# Patient Record
Sex: MALE | Race: WHITE | NOT HISPANIC OR LATINO | URBAN - METROPOLITAN AREA
[De-identification: names, ages, dates, MRNs, and addresses within clinical notes are randomized per-mention and may not be internally consistent; named-entity substitution may affect disease eponyms.]

---

## 2021-10-18 ENCOUNTER — HOSPITAL ENCOUNTER (EMERGENCY)
Facility: HOSPITAL | Age: 41
Discharge: HOME | End: 2021-10-18
Attending: EMERGENCY MEDICINE | Admitting: EMERGENCY MEDICINE
Payer: COMMERCIAL

## 2021-10-18 VITALS
TEMPERATURE: 97.5 F | RESPIRATION RATE: 18 BRPM | DIASTOLIC BLOOD PRESSURE: 89 MMHG | SYSTOLIC BLOOD PRESSURE: 130 MMHG | WEIGHT: 190 LBS | OXYGEN SATURATION: 95 % | HEART RATE: 110 BPM

## 2021-10-18 DIAGNOSIS — S91.351A DOG BITE OF RIGHT FOOT, INITIAL ENCOUNTER: Primary | ICD-10-CM
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
## 2021-10-18 PROCEDURE — 99284 EMERGENCY DEPT VISIT MOD MDM: CPT | Performed by: PHYSICIAN ASSISTANT

## 2021-10-18 PROCEDURE — 12002 RPR S/N/AX/GEN/TRNK2.6-7.5CM: CPT | Performed by: PHYSICIAN ASSISTANT

## 2021-10-18 PROCEDURE — 99283 EMERGENCY DEPT VISIT LOW MDM: CPT

## 2021-10-18 RX ORDER — AMOXICILLIN AND CLAVULANATE POTASSIUM 875; 125 MG/1; MG/1
1 TABLET, FILM COATED ORAL EVERY 12 HOURS
Qty: 14 TABLET | Refills: 0 | Status: SHIPPED | OUTPATIENT
Start: 2021-10-18 | End: 2021-10-18 | Stop reason: SDUPTHER

## 2021-10-18 RX ORDER — LIDOCAINE HYDROCHLORIDE AND EPINEPHRINE 10; 10 MG/ML; UG/ML
10 INJECTION, SOLUTION INFILTRATION; PERINEURAL ONCE
Status: COMPLETED | OUTPATIENT
Start: 2021-10-18 | End: 2021-10-18

## 2021-10-18 RX ORDER — AMOXICILLIN AND CLAVULANATE POTASSIUM 875; 125 MG/1; MG/1
1 TABLET, FILM COATED ORAL EVERY 12 HOURS
Qty: 14 TABLET | Refills: 0 | Status: SHIPPED | OUTPATIENT
Start: 2021-10-18 | End: 2021-10-25

## 2021-10-18 RX ADMIN — LIDOCAINE HYDROCHLORIDE,EPINEPHRINE BITARTRATE 10 ML: 10; .01 INJECTION, SOLUTION INFILTRATION; PERINEURAL at 10:24

## 2022-03-23 ENCOUNTER — APPOINTMENT (EMERGENCY)
Dept: RADIOLOGY | Facility: HOSPITAL | Age: 42
DRG: 812 | End: 2022-03-23
Payer: COMMERCIAL

## 2022-03-23 ENCOUNTER — HOSPITAL ENCOUNTER (INPATIENT)
Facility: HOSPITAL | Age: 42
LOS: 1 days | Discharge: HOME | DRG: 812 | End: 2022-03-24
Attending: EMERGENCY MEDICINE | Admitting: INTERNAL MEDICINE
Payer: COMMERCIAL

## 2022-03-23 DIAGNOSIS — O09.899: ICD-10-CM

## 2022-03-23 DIAGNOSIS — J69.0 ASPIRATION PNEUMONITIS (HCC): ICD-10-CM

## 2022-03-23 DIAGNOSIS — Z98.890: ICD-10-CM

## 2022-03-23 DIAGNOSIS — J96.90 RESPIRATORY FAILURE (HCC): ICD-10-CM

## 2022-03-23 DIAGNOSIS — T50.901A POLYSUBSTANCE OVERDOSE: Primary | ICD-10-CM

## 2022-03-23 PROBLEM — T50.911A DRUG OVERDOSE, MULTIPLE DRUGS: Status: ACTIVE | Noted: 2022-03-23

## 2022-03-23 PROBLEM — R77.8 ELEVATED TROPONIN: Status: ACTIVE | Noted: 2022-03-23

## 2022-03-23 LAB
2HR DELTA HS TROPONIN: 16 NG/L
4HR DELTA HS TROPONIN: 28 NG/L
ALBUMIN SERPL BCP-MCNC: 3.3 G/DL (ref 3.5–5)
ALP SERPL-CCNC: 87 U/L (ref 46–116)
ALT SERPL W P-5'-P-CCNC: 40 U/L (ref 12–78)
AMPHETAMINES SERPL QL SCN: POSITIVE
ANION GAP SERPL CALCULATED.3IONS-SCNC: 5 MMOL/L (ref 4–13)
APAP SERPL-MCNC: <2 UG/ML (ref 10–20)
AST SERPL W P-5'-P-CCNC: 26 U/L (ref 5–45)
ATRIAL RATE: 105 BPM
BARBITURATES UR QL: NEGATIVE
BASE EX.OXY STD BLDV CALC-SCNC: 90.5 % (ref 60–80)
BASE EXCESS BLDA CALC-SCNC: 1 MMOL/L (ref -2–3)
BASE EXCESS BLDV CALC-SCNC: -0.4 MMOL/L
BASOPHILS # BLD AUTO: 0.07 THOUSANDS/ΜL (ref 0–0.1)
BASOPHILS NFR BLD AUTO: 1 % (ref 0–1)
BENZODIAZ UR QL: NEGATIVE
BILIRUB SERPL-MCNC: 0.42 MG/DL (ref 0.2–1)
BUN SERPL-MCNC: 14 MG/DL (ref 5–25)
CA-I BLD-SCNC: 1.25 MMOL/L (ref 1.12–1.32)
CALCIUM ALBUM COR SERPL-MCNC: 8.9 MG/DL (ref 8.3–10.1)
CALCIUM SERPL-MCNC: 8.3 MG/DL (ref 8.3–10.1)
CARDIAC TROPONIN I PNL SERPL HS: 22 NG/L
CARDIAC TROPONIN I PNL SERPL HS: 32 NG/L
CARDIAC TROPONIN I PNL SERPL HS: 34 NG/L
CARDIAC TROPONIN I PNL SERPL HS: 6 NG/L
CHLORIDE SERPL-SCNC: 108 MMOL/L (ref 100–108)
CO2 SERPL-SCNC: 26 MMOL/L (ref 21–32)
COCAINE UR QL: NEGATIVE
CREAT SERPL-MCNC: 0.85 MG/DL (ref 0.6–1.3)
EOSINOPHIL # BLD AUTO: 0.17 THOUSAND/ΜL (ref 0–0.61)
EOSINOPHIL NFR BLD AUTO: 2 % (ref 0–6)
ERYTHROCYTE [DISTWIDTH] IN BLOOD BY AUTOMATED COUNT: 12.4 % (ref 11.6–15.1)
ETHANOL SERPL-MCNC: <3 MG/DL (ref 0–3)
GFR SERPL CREATININE-BSD FRML MDRD: 108 ML/MIN/1.73SQ M
GLUCOSE SERPL-MCNC: 123 MG/DL (ref 65–140)
GLUCOSE SERPL-MCNC: 167 MG/DL (ref 65–140)
HCO3 BLDA-SCNC: 28.3 MMOL/L (ref 24–30)
HCO3 BLDV-SCNC: 26.6 MMOL/L (ref 24–30)
HCT VFR BLD AUTO: 47 % (ref 36.5–49.3)
HCT VFR BLD CALC: 47 % (ref 36.5–49.3)
HGB BLD-MCNC: 15.8 G/DL (ref 12–17)
HGB BLDA-MCNC: 16 G/DL (ref 12–17)
IMM GRANULOCYTES # BLD AUTO: 0.04 THOUSAND/UL (ref 0–0.2)
IMM GRANULOCYTES NFR BLD AUTO: 0 % (ref 0–2)
INR PPP: 1.02 (ref 0.84–1.19)
LYMPHOCYTES # BLD AUTO: 2.74 THOUSANDS/ΜL (ref 0.6–4.47)
LYMPHOCYTES NFR BLD AUTO: 27 % (ref 14–44)
MCH RBC QN AUTO: 29.9 PG (ref 26.8–34.3)
MCHC RBC AUTO-ENTMCNC: 33.6 G/DL (ref 31.4–37.4)
MCV RBC AUTO: 89 FL (ref 82–98)
METHADONE UR QL: NEGATIVE
MONOCYTES # BLD AUTO: 0.65 THOUSAND/ΜL (ref 0.17–1.22)
MONOCYTES NFR BLD AUTO: 6 % (ref 4–12)
NEUTROPHILS # BLD AUTO: 6.47 THOUSANDS/ΜL (ref 1.85–7.62)
NEUTS SEG NFR BLD AUTO: 64 % (ref 43–75)
NRBC BLD AUTO-RTO: 0 /100 WBCS
O2 CT BLDV-SCNC: 21 ML/DL
OPIATES UR QL SCN: NEGATIVE
OXYCODONE+OXYMORPHONE UR QL SCN: NEGATIVE
P AXIS: 65 DEGREES
PCO2 BLD: 30 MMOL/L (ref 21–32)
PCO2 BLD: 54.6 MM HG (ref 42–50)
PCO2 BLDV: 52 MM HG (ref 42–50)
PCP UR QL: NEGATIVE
PH BLD: 7.32 [PH] (ref 7.3–7.4)
PH BLDV: 7.33 [PH] (ref 7.3–7.4)
PLATELET # BLD AUTO: 302 THOUSANDS/UL (ref 149–390)
PLATELET # BLD AUTO: 327 THOUSANDS/UL (ref 149–390)
PMV BLD AUTO: 8.7 FL (ref 8.9–12.7)
PMV BLD AUTO: 8.9 FL (ref 8.9–12.7)
PO2 BLD: 35 MM HG (ref 35–45)
PO2 BLDV: 66.4 MM HG (ref 35–45)
POTASSIUM BLD-SCNC: 3.8 MMOL/L (ref 3.5–5.3)
POTASSIUM SERPL-SCNC: 3.9 MMOL/L (ref 3.5–5.3)
PR INTERVAL: 176 MS
PROT SERPL-MCNC: 6.5 G/DL (ref 6.4–8.2)
PROTHROMBIN TIME: 13 SECONDS (ref 11.6–14.5)
QRS AXIS: 88 DEGREES
QRSD INTERVAL: 102 MS
QT INTERVAL: 370 MS
QTC INTERVAL: 489 MS
RBC # BLD AUTO: 5.28 MILLION/UL (ref 3.88–5.62)
SALICYLATES SERPL-MCNC: <3 MG/DL (ref 3–20)
SAO2 % BLD FROM PO2: 62 % (ref 60–85)
SODIUM BLD-SCNC: 138 MMOL/L (ref 136–145)
SODIUM SERPL-SCNC: 139 MMOL/L (ref 136–145)
SPECIMEN SOURCE: ABNORMAL
T WAVE AXIS: 74 DEGREES
THC UR QL: NEGATIVE
VENTRICULAR RATE: 105 BPM
WBC # BLD AUTO: 10.14 THOUSAND/UL (ref 4.31–10.16)

## 2022-03-23 PROCEDURE — 82330 ASSAY OF CALCIUM: CPT

## 2022-03-23 PROCEDURE — 84484 ASSAY OF TROPONIN QUANT: CPT | Performed by: STUDENT IN AN ORGANIZED HEALTH CARE EDUCATION/TRAINING PROGRAM

## 2022-03-23 PROCEDURE — 82803 BLOOD GASES ANY COMBINATION: CPT

## 2022-03-23 PROCEDURE — 85610 PROTHROMBIN TIME: CPT | Performed by: STUDENT IN AN ORGANIZED HEALTH CARE EDUCATION/TRAINING PROGRAM

## 2022-03-23 PROCEDURE — 84295 ASSAY OF SERUM SODIUM: CPT

## 2022-03-23 PROCEDURE — 93010 ELECTROCARDIOGRAM REPORT: CPT | Performed by: INTERNAL MEDICINE

## 2022-03-23 PROCEDURE — G1004 CDSM NDSC: HCPCS

## 2022-03-23 PROCEDURE — 99285 EMERGENCY DEPT VISIT HI MDM: CPT

## 2022-03-23 PROCEDURE — 93005 ELECTROCARDIOGRAM TRACING: CPT

## 2022-03-23 PROCEDURE — 82805 BLOOD GASES W/O2 SATURATION: CPT

## 2022-03-23 PROCEDURE — 70450 CT HEAD/BRAIN W/O DYE: CPT

## 2022-03-23 PROCEDURE — 99245 OFF/OP CONSLTJ NEW/EST HI 55: CPT | Performed by: EMERGENCY MEDICINE

## 2022-03-23 PROCEDURE — 84132 ASSAY OF SERUM POTASSIUM: CPT

## 2022-03-23 PROCEDURE — 82077 ASSAY SPEC XCP UR&BREATH IA: CPT

## 2022-03-23 PROCEDURE — 85049 AUTOMATED PLATELET COUNT: CPT | Performed by: STUDENT IN AN ORGANIZED HEALTH CARE EDUCATION/TRAINING PROGRAM

## 2022-03-23 PROCEDURE — 99291 CRITICAL CARE FIRST HOUR: CPT | Performed by: EMERGENCY MEDICINE

## 2022-03-23 PROCEDURE — 85014 HEMATOCRIT: CPT

## 2022-03-23 PROCEDURE — 84484 ASSAY OF TROPONIN QUANT: CPT

## 2022-03-23 PROCEDURE — 80179 DRUG ASSAY SALICYLATE: CPT

## 2022-03-23 PROCEDURE — 80307 DRUG TEST PRSMV CHEM ANLYZR: CPT | Performed by: STUDENT IN AN ORGANIZED HEALTH CARE EDUCATION/TRAINING PROGRAM

## 2022-03-23 PROCEDURE — 71045 X-RAY EXAM CHEST 1 VIEW: CPT

## 2022-03-23 PROCEDURE — 82947 ASSAY GLUCOSE BLOOD QUANT: CPT

## 2022-03-23 PROCEDURE — 80053 COMPREHEN METABOLIC PANEL: CPT

## 2022-03-23 PROCEDURE — 80143 DRUG ASSAY ACETAMINOPHEN: CPT

## 2022-03-23 PROCEDURE — 36415 COLL VENOUS BLD VENIPUNCTURE: CPT

## 2022-03-23 PROCEDURE — 94760 N-INVAS EAR/PLS OXIMETRY 1: CPT

## 2022-03-23 PROCEDURE — 85025 COMPLETE CBC W/AUTO DIFF WBC: CPT

## 2022-03-23 RX ORDER — WARFARIN SODIUM 5 MG/1
5 TABLET ORAL
Status: DISCONTINUED | OUTPATIENT
Start: 2022-03-23 | End: 2022-03-24

## 2022-03-23 RX ORDER — NALOXONE HYDROCHLORIDE 1 MG/ML
1 INJECTION PARENTERAL ONCE
Status: COMPLETED | OUTPATIENT
Start: 2022-03-23 | End: 2022-03-23

## 2022-03-23 RX ORDER — ONDANSETRON 2 MG/ML
4 INJECTION INTRAMUSCULAR; INTRAVENOUS EVERY 6 HOURS PRN
Status: DISCONTINUED | OUTPATIENT
Start: 2022-03-23 | End: 2022-03-24 | Stop reason: HOSPADM

## 2022-03-23 RX ORDER — ACETAMINOPHEN 325 MG/1
650 TABLET ORAL EVERY 6 HOURS PRN
Status: DISCONTINUED | OUTPATIENT
Start: 2022-03-23 | End: 2022-03-24 | Stop reason: HOSPADM

## 2022-03-23 RX ORDER — NICOTINE 21 MG/24HR
1 PATCH, TRANSDERMAL 24 HOURS TRANSDERMAL DAILY
Status: DISCONTINUED | OUTPATIENT
Start: 2022-03-24 | End: 2022-03-24 | Stop reason: HOSPADM

## 2022-03-23 RX ADMIN — WARFARIN SODIUM 5 MG: 5 TABLET ORAL at 19:02

## 2022-03-23 RX ADMIN — DEXTROSE 1000 MG: 50 INJECTION, SOLUTION INTRAVENOUS at 17:02

## 2022-03-23 NOTE — ASSESSMENT & PLAN NOTE
-Drug of choice is methamphetamine  Snorted it this morning   -Responded well to narcan and so suspect it was possibly laced with fentanyl   -Toxicology is following   -received one dose intranasal narcan at home and one dose of IV narcan by EMS  -Chest Xray taken in ED shows bilateral consolidations with differential of aspiration vs non-cardiogenic pulmonary edema   Most likely pulmonary edema due to bilaterality   -Pt currently unwilling to start rehab    Plan:  Pt on room air  Discharge with narcan   Will plan for patient to follow-up at [facility] in [place]

## 2022-03-23 NOTE — ED NOTES
Per Dr Reynaldo Oconnell, VBG does not need to be sent due to running CG8+  Lab will discontinue order        Mario Alvarado RN  03/23/22 8494

## 2022-03-23 NOTE — ED PROVIDER NOTES
History  Chief Complaint   Patient presents with    Loss of Consciousness     Per EMS found for unresponsive  Given 0 4 Narcan intranasally and woke up  Upon ED arrival pt  obtunded  SpO2 73 on room air     71-year-old male is presenting via EMS after being found nonresponsive in a private residence  EMS reports that patient was first found unresponsive with blood in his nose and no apparent spontaneous respiratory effort  Members of the household administered 1 dose of intranasal Narcan with return of spontaneous ventilatory effort  Patient remained unresponsive when EMS arrived  EMS was able to obtain IV access, and noted that the patient had a room air SpO2 of 73%  They placed the patient on non-rebreather, and administered IV Narcan with return of responsiveness  Patient at that time became extremely combative, attempting to punch at EMS crew  Patient arrived in the emergency department restrained, somnolent but arousable  Satting 90% on non-rebreather at 15 L  EMS reported that there was suspicion of methamphetamine and narcotic use  Patient is a known user of nasal methamphetamine and has a past history of Percocet abuse  He has a sternotomy from which wife believes he had a 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  Patient is supposed to be on Coumadin, however has not been taking it  None       No past medical history on file  No past surgical history on file  No family history on file  I have reviewed and agree with the history as documented  E-Cigarette/Vaping    E-Cigarette Use Never User      E-Cigarette/Vaping Substances    Nicotine No     THC No     CBD No     Flavoring No     Other No     Unknown No      Social History     Tobacco Use    Smoking status: Current Every Day Smoker    Smokeless tobacco: Current User   Vaping Use    Vaping Use: Never used   Substance Use Topics    Alcohol use:  Yes    Drug use: Not Currently        Review of Systems   Unable to perform ROS: Patient unresponsive       Physical Exam  ED Triage Vitals   Temperature Pulse Respirations Blood Pressure SpO2   03/23/22 1301 03/23/22 1301 03/23/22 1301 03/23/22 1301 03/23/22 1301   98 3 °F (36 8 °C) (!) 111 22 102/68 (!) 73 %      Temp Source Heart Rate Source Patient Position - Orthostatic VS BP Location FiO2 (%)   03/23/22 1301 03/23/22 1301 03/23/22 1315 03/23/22 1301 --   Tympanic Monitor Lying Right arm       Pain Score       03/23/22 2300       No Pain             Orthostatic Vital Signs  Vitals:    03/24/22 0751 03/24/22 0800 03/24/22 0900 03/24/22 1120   BP:    119/71   Pulse: (!) 114 86 84 84   Patient Position - Orthostatic VS:           Physical Exam  Vitals and nursing note reviewed  Constitutional:       General: He is in acute distress  Appearance: He is well-developed and normal weight  He is not toxic-appearing or diaphoretic  HENT:      Head: Normocephalic and atraumatic  Right Ear: External ear normal       Left Ear: External ear normal       Nose: Nose normal  No congestion or rhinorrhea  Comments: Nasal trumpet in left nares, blood coming from left nares, some rhinorrhea noted from both nares  Mouth/Throat:      Mouth: Mucous membranes are dry  Pharynx: No oropharyngeal exudate or posterior oropharyngeal erythema  Eyes:      General: No scleral icterus  Extraocular Movements: Extraocular movements intact  Conjunctiva/sclera: Conjunctivae normal       Pupils: Pupils are equal, round, and reactive to light  Cardiovascular:      Rate and Rhythm: Regular rhythm  Tachycardia present  Pulses: Normal pulses  Heart sounds: Murmur (Systolic click) heard  No friction rub  Pulmonary:      Effort: Respiratory distress present  Breath sounds: No stridor  Rhonchi present  No wheezing or rales  Comments: Tachypnea up to 38 breaths per minute on non-rebreather  Abdominal:      Palpations: Abdomen is soft   There is no mass  Hernia: No hernia is present  Musculoskeletal:         General: No swelling  Normal range of motion  Cervical back: Normal range of motion and neck supple  Right lower leg: No edema  Left lower leg: No edema  Skin:     General: Skin is warm and dry  Capillary Refill: Capillary refill takes less than 2 seconds     Psychiatric:         Mood and Affect: Mood normal          Behavior: Behavior normal          ED Medications  Medications   naloxone (FOR EMS ONLY) (NARCAN) 2 MG/2ML injection 2 mg (0 mg Does not apply Given to EMS 3/23/22 1311)   ceftriaxone (ROCEPHIN) 1 g/50 mL in dextrose IVPB (0 mg Intravenous Stopped 3/23/22 1752)   potassium chloride (K-DUR,KLOR-CON) CR tablet 40 mEq (40 mEq Oral Given 3/24/22 0907)       Diagnostic Studies  Results Reviewed     Procedure Component Value Units Date/Time    Comprehensive metabolic panel [[id number]]  (Abnormal) Collected: 03/24/22 0611    Lab Status: Final result Specimen: Blood from Arm, Right Updated: 03/24/22 0751     Sodium 137 mmol/L      Potassium 3 6 mmol/L      Chloride 104 mmol/L      CO2 28 mmol/L      ANION GAP 5 mmol/L      BUN 14 mg/dL      Creatinine 0 89 mg/dL      Glucose 127 mg/dL      Calcium 8 9 mg/dL      Corrected Calcium 9 7 mg/dL      AST 18 U/L      ALT 37 U/L      Alkaline Phosphatase 67 U/L      Total Protein 6 5 g/dL      Albumin 3 0 g/dL      Total Bilirubin 1 08 mg/dL      eGFR 106 ml/min/1 73sq m     Narrative:      [place] guidelines for Chronic Kidney Disease (CKD):     Stage 1 with normal or high GFR (GFR > 90 mL/min/1 73 square meters)    Stage 2 Mild CKD (GFR = 60-89 mL/min/1 73 square meters)    Stage 3A Moderate CKD (GFR = 45-59 mL/min/1 73 square meters)    Stage 3B Moderate CKD (GFR = 30-44 mL/min/1 73 square meters)    Stage 4 Severe CKD (GFR = 15-29 mL/min/1 73 square meters)    Stage 5 End Stage CKD (GFR <15 mL/min/1 73 square meters)  Note: GFR calculation is accurate only with a steady state creatinine    Phosphorus [[id number]]  (Normal) Collected: 03/24/22 0611    Lab Status: Final result Specimen: Blood from Arm, Right Updated: 03/24/22 0653     Phosphorus 2 7 mg/dL     Magnesium [[id number]]  (Normal) Collected: 03/24/22 0611    Lab Status: Final result Specimen: Blood from Arm, Right Updated: 03/24/22 0653     Magnesium 2 2 mg/dL     Protime-INR [[id number]]  (Abnormal) Collected: 03/24/22 0611    Lab Status: Final result Specimen: Blood from Arm, Right Updated: 03/24/22 0649     Protime 14 7 seconds      INR 1 19    CBC and differential [[id number]]  (Abnormal) Collected: 03/24/22 0611    Lab Status: Final result Specimen: Blood from Arm, Right Updated: 03/24/22 0625     WBC 21 28 Thousand/uL      RBC 5 11 Million/uL      Hemoglobin 15 1 g/dL      Hematocrit 45 0 %      MCV 88 fL      MCH 29 5 pg      MCHC 33 6 g/dL      RDW 12 6 %      MPV 9 1 fL      Platelets 227 Thousands/uL      nRBC 0 /100 WBCs      Neutrophils Relative 84 %      Immat GRANS % 1 %      Lymphocytes Relative 10 %      Monocytes Relative 5 %      Eosinophils Relative 0 %      Basophils Relative 0 %      Neutrophils Absolute 17 80 Thousands/µL      Immature Grans Absolute 0 10 Thousand/uL      Lymphocytes Absolute 2 11 Thousands/µL      Monocytes Absolute 1 12 Thousand/µL      Eosinophils Absolute 0 07 Thousand/µL      Basophils Absolute 0 08 Thousands/µL     HS Troponin 0hr (reflex protocol) [[id number]]  (Normal) Collected: 03/23/22 2135    Lab Status: Final result Specimen: Blood from Arm, Left Updated: 03/23/22 2225     hs TnI 0hr 32 ng/L     HS Troponin I 4hr [[id number]]  (Abnormal) Collected: 03/23/22 1806    Lab Status: Final result Specimen: Blood from Arm, Right Updated: 03/23/22 1853     hs TnI 4hr 34 ng/L      Delta 4hr hsTnI 28 ng/L     Rapid drug screen, urine [[id number]]  (Abnormal) Collected: 03/23/22 1806    Lab Status: Final result Specimen: Urine, Other Updated: 03/23/22 3467 Amph/Meth UR Positive     Barbiturate Ur Negative     Benzodiazepine Urine Negative     Cocaine Urine Negative     Methadone Urine Negative     Opiate Urine Negative     PCP Ur Negative     THC Urine Negative     Oxycodone Urine Negative    Narrative:      FOR MEDICAL PURPOSES ONLY  IF CONFIRMATION NEEDED PLEASE CONTACT THE LAB WITHIN 5 DAYS      Drug Screen Cutoff Levels:  AMPHETAMINE/METHAMPHETAMINES  1000 ng/mL  BARBITURATES     200 ng/mL  BENZODIAZEPINES     200 ng/mL  COCAINE      300 ng/mL  METHADONE      300 ng/mL  OPIATES      300 ng/mL  PHENCYCLIDINE     25 ng/mL  THC       50 ng/mL  OXYCODONE      100 ng/mL    Protime-INR [[id number]]  (Normal) Collected: 03/23/22 1806    Lab Status: Final result Specimen: Blood from Arm, Right Updated: 03/23/22 1826     Protime 13 0 seconds      INR 1 02    Platelet count [[id number]]  (Abnormal) Collected: 03/23/22 1806    Lab Status: Final result Specimen: Blood from Arm, Right Updated: 03/23/22 1816     Platelets 992 Thousands/uL      MPV 8 7 fL     POCT Blood Gas (CG8+) [[id number]]  (Abnormal) Collected: 03/23/22 1656    Lab Status: Final result Specimen: Venous Updated: 03/23/22 1703     ph, Matti ISTAT 7 323     pCO2, Matti i-STAT 54 6 mm HG      pO2, Matti i-STAT 35 0 mm HG      BE, i-STAT 1 mmol/L      HCO3, Matti i-STAT 28 3 mmol/L      CO2, i-STAT 30 mmol/L      O2 Sat, i-STAT 62 %      SODIUM, I-STAT 138 mmol/l      Potassium, i-STAT 3 8 mmol/L      Calcium, Ionized i-STAT 1 25 mmol/L      Hct, i-STAT 47 %      Hgb, i-STAT 16 0 g/dl      Glucose, i-STAT 123 mg/dl      Specimen Type VENOUS    HS Troponin I 2hr [[id number]]  (Normal) Collected: 03/23/22 1534    Lab Status: Final result Specimen: Blood from Arm, Right Updated: 03/23/22 1610     hs TnI 2hr 22 ng/L      Delta 2hr hsTnI 16 ng/L     HS Troponin 0hr (reflex protocol) [[id number]]  (Normal) Collected: 03/23/22 1325    Lab Status: Final result Specimen: Blood from Arm, Left Updated: 03/23/22 1407     hs TnI 0hr 6 ng/L     Comprehensive metabolic panel [[id number]]  (Abnormal) Collected: 03/23/22 1314    Lab Status: Final result Specimen: Blood from Arm, Left Updated: 03/23/22 1353     Sodium 139 mmol/L      Potassium 3 9 mmol/L      Chloride 108 mmol/L      CO2 26 mmol/L      ANION GAP 5 mmol/L      BUN 14 mg/dL      Creatinine 0 85 mg/dL      Glucose 167 mg/dL      Calcium 8 3 mg/dL      Corrected Calcium 8 9 mg/dL      AST 26 U/L      ALT 40 U/L      Alkaline Phosphatase 87 U/L      Total Protein 6 5 g/dL      Albumin 3 3 g/dL      Total Bilirubin 0 42 mg/dL      eGFR 108 ml/min/1 73sq m     Narrative:      [place] guidelines for Chronic Kidney Disease (CKD):     Stage 1 with normal or high GFR (GFR > 90 mL/min/1 73 square meters)    Stage 2 Mild CKD (GFR = 60-89 mL/min/1 73 square meters)    Stage 3A Moderate CKD (GFR = 45-59 mL/min/1 73 square meters)    Stage 3B Moderate CKD (GFR = 30-44 mL/min/1 73 square meters)    Stage 4 Severe CKD (GFR = 15-29 mL/min/1 73 square meters)    Stage 5 End Stage CKD (GFR <15 mL/min/1 73 square meters)  Note: GFR calculation is accurate only with a steady state creatinine    Salicylate level [[id number]]  (Abnormal) Collected: 03/23/22 1314    Lab Status: Final result Specimen: Blood from Arm, Left Updated: 87/03/33 9879     Salicylate Lvl <3 mg/dL     Acetaminophen level-If concentration is detectable, please discuss with medical  on call   [[id number]]  (Abnormal) Collected: 03/23/22 1314    Lab Status: Final result Specimen: Blood from Arm, Left Updated: 03/23/22 1353     Acetaminophen Level <2 ug/mL     Ethanol [[id number]]  (Normal) Collected: 03/23/22 1314    Lab Status: Final result Specimen: Blood from Arm, Left Updated: 03/23/22 1347     Ethanol Lvl <3 mg/dL     CBC and differential [[id number]] Collected: 03/23/22 1314    Lab Status: Final result Specimen: Blood from Arm, Left Updated: 03/23/22 1331     WBC 10 14 Thousand/uL      RBC 5 28 Million/uL      Hemoglobin 15 8 g/dL      Hematocrit 47 0 %      MCV 89 fL      MCH 29 9 pg      MCHC 33 6 g/dL      RDW 12 4 %      MPV 8 9 fL      Platelets 892 Thousands/uL      nRBC 0 /100 WBCs      Neutrophils Relative 64 %      Immat GRANS % 0 %      Lymphocytes Relative 27 %      Monocytes Relative 6 %      Eosinophils Relative 2 %      Basophils Relative 1 %      Neutrophils Absolute 6 47 Thousands/µL      Immature Grans Absolute 0 04 Thousand/uL      Lymphocytes Absolute 2 74 Thousands/µL      Monocytes Absolute 0 65 Thousand/µL      Eosinophils Absolute 0 17 Thousand/µL      Basophils Absolute 0 07 Thousands/µL     Blood gas, venous [[id number]]  (Abnormal) Collected: 03/23/22 1314    Lab Status: Final result Specimen: Blood from Arm, Left Updated: 03/23/22 1325     pH, Matti 7 326     pCO2, Matti 52 0 mm Hg      pO2, Matti 66 4 mm Hg      HCO3, Matti 26 6 mmol/L      Base Excess, Matti -0 4 mmol/L      O2 Content, Matti 21 0 ml/dL      O2 HGB, VENOUS 90 5 %                  CT head without contrast   Final Result by Jose Henning MD (03/23 1500)      No acute intracranial abnormality  Workstation performed: [id number]         XR chest 1 view portable   Final Result by Evelia Snyder MD (03/23 0203)      1  Diffuse bilateral lung consolidation                    Workstation performed: [id number]               Procedures  ECG 12 Lead Documentation Only    Date/Time: 3/26/2022 3:54 PM  Performed by: Yvan Martínez MD  Authorized by: Yvan Martínez MD     Indications / Diagnosis:  Unresponsive/ suspected polysubstance overdose  ECG reviewed by me, the ED Provider: yes    Patient location:  ED  Previous ECG:     Previous ECG:  Unavailable  Interpretation:     Interpretation: abnormal    Rate:     ECG rate:  105    ECG rate assessment: tachycardic    Rhythm:     Rhythm: sinus tachycardia    Ectopy:     Ectopy: none    Conduction:     Conduction: abnormal      Abnormal conduction: incomplete RBBB    ST segments:     ST segments:  Normal  T waves:     T waves: non-specific and inverted      Inverted:  AVL, V1 and V2  Other findings:     Other findings: LAE            ED Course                             SBIRT 20yo+      Most Recent Value   SBIRT (24 yo +)    In order to provide better care to our patients, we are screening all of our patients for alcohol and drug use  Would it be okay to ask you these screening questions? No Filed at: 03/23/2022 1538                [facility]  Number of Diagnoses or Management Options  Polysubstance overdose  Diagnosis management comments: 80-year-old male brought in by EMS after being found unresponsive at home  Patient was administered Narcan intranasal prior to arrival without resolution of his unresponsiveness  However patient was started on IV back of the ambulance and given IV Narcan x1 dose and he quickly awoke swinging at EMS  On arrival, patient was again somnolent but maintaining his airway  Patient was placed immediately on non-rebreather and end-tidal carbon dioxide detector  Patient was able to maintain airway without any assistance throughout the course of his emergency department stay  Workup included a CT of his head as well as the lab work and cardiac workup looking for other causes of his unresponsiveness  However it is most likely that the patient's condition is due to a poly substance overdose  Patient has known history of methamphetamine abuse, however wife who accompanied patient and was seen at bedside states that patient took unknown substance today which may have been a long-acting fentanyl synthetic derivative  Patient is will be monitored closely on step-down unit  I spoke with admitting team (S OD) agreed to admit patient with continuous monitoring on step-down        Disposition  Final diagnoses:   Polysubstance overdose     Time reflects when diagnosis was documented in both MDM as applicable and the Disposition within this note     Time User Action Codes Description Comment    3/23/2022  4:50 PM Terrence Whitten Add [T50 901A] Polysubstance overdose     3/24/2022  3:12 PM Niki Vines Add [J69 0] Aspiration pneumonitis      3/24/2022  3:12 PM Niki Vines Add [O09 899,  Z98 890] History of cardiac surgery        ED Disposition     ED Disposition Condition Date/Time Comment    Admit Stable Wed Mar 23, 2022  4:58 PM Case was discussed with Dr Margareth Corrales and the patient's admission status was agreed to be Admission Status: inpatient status to the service of Dr Antoinette Kenyon   Follow-up Information     Follow up With Specialties Details Why Contact Info Additional Information    Infolink  Schedule an appointment as soon as possible for a visit  [facility] Schedule an appointment as soon as possible for a visit in 1 week(s)  [place] [id number] [facility] [address] [id number]  [facility] [address], [address], [address] [phone]          Discharge Medication List as of 3/24/2022  3:14 PM      START taking these medications    Details   amoxicillin-clavulanate (AUGMENTIN) 875-125 mg per tablet Take 1 tablet by mouth every 12 (twelve) hours for 5 days, Starting Thu 3/24/2022, Until Tue 3/29/2022, Normal      naloxone (NARCAN) 4 mg/0 1 mL nasal spray Administer 1 spray into a nostril  If no response after 2-3 minutes, give another dose in the other nostril using a new spray , Normal               PDMP Review       Value Time User    PDMP Reviewed  Yes 3/23/2022  5:11 PM Benita Briones MD           ED Provider  Attending physically available and evaluated Tyrell Dejessu I managed the patient along with the ED Attending      Electronically Signed by         Kiesha aPige MD  03/26/22 9837

## 2022-03-23 NOTE — ASSESSMENT & PLAN NOTE
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

## 2022-03-23 NOTE — ED NOTES
SpO2:79%, Rn in room to find pt had removed nonrebreather mask  Nonrebreather replaced, pt's SpO2: 91% on 15L nonrebreather        Tam Villaseñor RN  03/23/22 1533

## 2022-03-23 NOTE — ASSESSMENT & PLAN NOTE
Pt presented after overdose on Methamphetamine possibly laced with fentanyl  Chest xray showed diffuse bilateral consolidations -> concerning for possible aspiration pneumonitis  Given 1g/50 mL ceftriaxone in ED    Currently on room air    Plan:  Discharge on PO augmentin for 5 days for aspiration pneumonitis none

## 2022-03-23 NOTE — H&P
INTERNAL MEDICINE RESIDENCY ADMISSION H&P     Name: China Larson   Age & Sex: 40 y o  male   MRN: [id number]  Unit/Bed#: ED 22   Encounter: [id number]  Primary Care Provider: No primary care provider on file  Code Status: Level 1 - Full Code  Admission Status: INPATIENT   Disposition: Patient requires Level 2 Step Down     Admit to team: {DEACON COLES:[id number]}    ASSESSMENT/PLAN     Active Problems:    Drug overdose, multiple drugs    History of cardiac surgery     Aspiration pneumonitis       Aspiration pneumonitis   Assessment & Plan  Pt overdosed on Methamphetamine laced with fentanyl  Chest xray showed diffuse bilateral consolidations  Given 1g/50 mL ceftriaxone in ED  Currently on 12 L NC    Plan:  Continue to monitor respiratory status   Aspiration precautions and head of bed elevation    History of cardiac surgery   Assessment & Plan  Pt has a thoracotomy scar, however no recorded surgery is chart and is a poor historian  Due to history of IV drug abuse, suspect it is for a heart valve replacement following endocarditis  Pt reports he is not on anticoagulation because he "ran out"  Unsure when he ran out or when he had the surgery  Plan:  Check INR and start on warfarin     Drug overdose, multiple drugs  Assessment & Plan  -Drug of choice is methamphetamine  Snorted it this morning   -Responded well to narcan and so suspect it was laced with fentanyl   -Already seen by toxicology  -Given one dose intranasal narcan at home and one dose of IV narcan by EMS  -Chest Xray taken in ED shows bilateral consolidations with differential of aspiration vs non-cardiogenic pulmonary edema  Most likely pulmonary edema due to bilaterality   -Pt currently unwilling to start rehab, will reassess in the morning      Plan:  Narcan 0 4mg IV PRN for respiratory depression  Currently on 12 L midflow - monitor respiratory status and continue supplemental oxygen as needed for hypoxia  Consider benzodiazepines or haldol for agitation  Reassess willingness for rehab in the morning  Discharge with narcan         VTE Pharmacologic Prophylaxis: Warfarin (Coumadin)  VTE Mechanical Prophylaxis: sequential compression device    CHIEF COMPLAINT     Chief Complaint   Patient presents with    Loss of Consciousness     Per EMS found for unresponsive  Given 0 4 Narcan intranasally and woke up  Upon ED arrival pt  obtunded  SpO2 73 on room air      HISTORY OF PRESENT ILLNESS     23YOM with a PMHx of IV drug use and a thoracotomy scar from suspected heart valve replacement was brought to the ED after an overdose on Methamphetamine laced with fentanyl  He reported snorting the methamphetamine ealier in the morning, became unresponsive with respiratory depression, and his wife called EMS  He was given one dose of intranasal narcan by his wife and one dose of IV narcan by EMS upon arrival, which he began responding to  O2 saturation was 73% upon arrival, and a nonrebreather mask was placed with an improvement to 91% O2  Respiratory was consulted and requested the patient be placed on 12L midflow nasal canula  CBC and CMP were negative, troponins at 0 and 1 hrs were 6 and 22, respectively  Urine drug screen positive for amphetamines  Chest xray revealed diffuse bilateral lung consolidations suspicious for aspiration vs pulmonary edema  CT head was unremarkable  Patient is a poor historian and intermittently responds to questions  Wife cannot be found however per ED does not know anything about patient's history        REVIEW OF SYSTEMS     Review of Systems   Unable to perform ROS: Mental status change     OBJECTIVE     Vitals:    22 1539 22 1600 22 1645 22 1745   BP:  148/90 137/92 152/82   BP Location:  Right arm  Right arm   Pulse:  100 (!) 109 (!) 107   Resp:  (!) 32 (!) 32    Temp:       TempSrc:       SpO2: 92% 92% 93% 92%      Temperature:   Temp (24hrs), Av 3 °F (36 8 °C), Min:98 3 °F (36 8 °C), Max:98 3 °F (36 8 °C)    Temperature: 98 3 °F (36 8 °C)  Intake & Output:  I/O       03/21 0701 03/22 0700 03/22 0701 03/23 0700 03/23 0701 03/24 0700    IV Piggyback   50    Total Intake   50    Net   +50               Weights: There is no height or weight on file to calculate BMI  Weight (last 2 days)     None        Physical Exam  Vitals and nursing note reviewed  Constitutional:       Appearance: He is well-developed  He is ill-appearing and toxic-appearing  Comments: somnolent and intermittently responds to questions   HENT:      Head: Normocephalic and atraumatic  Nose:      Comments: Dried blood present in nostrils   Eyes:      Conjunctiva/sclera: Conjunctivae normal    Cardiovascular:      Rate and Rhythm: Normal rate and regular rhythm  Pulses: Normal pulses  Pulmonary:      Effort: Respiratory distress present  Comments: Bilateral course breath sounds  Abdominal:      Palpations: Abdomen is soft  Tenderness: There is no abdominal tenderness  Musculoskeletal:      Cervical back: Neck supple  Skin:     General: Skin is warm and dry  PAST MEDICAL HISTORY   No past medical history on file  PAST SURGICAL HISTORY   No past surgical history on file  SOCIAL & FAMILY HISTORY     Social History     Substance and Sexual Activity   Alcohol Use Yes     Social History     Substance and Sexual Activity   Drug Use Not Currently     Social History     Tobacco Use   Smoking Status Current Every Day Smoker   Smokeless Tobacco Current User     No family history on file  LABORATORY DATA     Labs: I have personally reviewed pertinent reports      Results from last 7 days   Lab Units 03/23/22  1806 03/23/22  1656 03/23/22  1314   WBC Thousand/uL  --   --  10 14   HEMOGLOBIN g/dL  --   --  15 8   I STAT HEMOGLOBIN g/dl  --  16 0  --    HEMATOCRIT %  --   --  47 0   HEMATOCRIT, ISTAT %  --  47  --    PLATELETS Thousands/uL 302  --  327   NEUTROS PCT %  --   --  64   MONOS PCT %  --   --  6 Results from last 7 days   Lab Units 03/23/22  1656 03/23/22  1314   POTASSIUM mmol/L  --  3 9   CHLORIDE mmol/L  --  108   CO2 mmol/L  --  26   CO2, I-STAT mmol/L 30  --    BUN mg/dL  --  14   CREATININE mg/dL  --  0 85   CALCIUM mg/dL  --  8 3   ALK PHOS U/L  --  87   ALT U/L  --  40   AST U/L  --  26   GLUCOSE, ISTAT mg/dl 123  --                           Micro:  No results found for: BLOODCX, URINECX, WOUNDCULT, SPUTUMCULTUR  IMAGING & DIAGNOSTIC TESTS     Imaging: I have personally reviewed pertinent reports  XR chest 1 view portable    Result Date: 3/23/2022  Impression: 1  Diffuse bilateral lung consolidation  Workstation performed: [id number]     CT head without contrast    Result Date: 3/23/2022  Impression: No acute intracranial abnormality  Workstation performed: [id number]     EKG, Pathology, and Other Studies: I have personally reviewed pertinent reports       ALLERGIES     Allergies   Allergen Reactions    Vancomycin Hives     MEDICATIONS PRIOR TO ARRIVAL     Prior to Admission medications    Not on File     MEDICATIONS ADMINISTERED IN LAST 24 HOURS     Medication Administration - last 24 hours from 03/22/2022 1820 to 03/23/2022 1820       Date/Time Order Dose Route Action Action by     03/23/2022 1311 naloxone (FOR EMS ONLY) [facility]) 2 MG/2ML injection 2 mg 0 mg Does not apply Given to [facility], RN     03/23/2022 1658 piperacillin-tazobactam (ZOSYN) 3 375 g in sodium chloride 0 9 % 100 mL IVPB 3 375 g Intravenous Not Given Tam Villaseñor RN     03/23/2022 1752 ceftriaxone (ROCEPHIN) 1 g/50 mL in dextrose IVPB 0 mg Intravenous Stopped Tory Serna     03/23/2022 1702 ceftriaxone (ROCEPHIN) 1 g/50 mL in dextrose IVPB 1,000 mg Intravenous New Bag Tam Villaseñor RN        CURRENT MEDICATIONS     Current Facility-Administered Medications   Medication Dose Route Frequency Provider Last Rate    acetaminophen  650 mg Oral Q6H PRN Lucy Kaplan MD      naloxone  0 04 mg Intravenous Q1MIN PRN MD Charly Mejíagerson Olesya ON 3/24/2022] nicotine  1 patch Transdermal Daily Neri Zhang MD      ondansetron  4 mg Intravenous Q6H PRN Neri Zhang MD          acetaminophen, 650 mg, Q6H PRN  naloxone, 0 04 mg, Q1MIN PRN  ondansetron, 4 mg, Q6H PRN        Admission Time  I spent 30 minutes admitting the patient  This involved direct patient contact where I performed a full history and physical, reviewing previous records, and reviewing laboratory and other diagnostic studies  Portions of the record may have been created with voice recognition software  Occasional wrong word or "sound a like" substitutions may have occurred due to the inherent limitations of voice recognition software    Read the chart carefully and recognize, using context, where substitutions have occurred     ==  [facility]  Internal Medicine MS3

## 2022-03-23 NOTE — ED NOTES
Per Dr Jesus Braun, respiratory called for patient to be placed on midflow        Jia Clark RN  03/23/22 1293

## 2022-03-23 NOTE — ED NOTES
Pt  On rebreather, spO2 91%  Dr Kayleigh Alfredo at bedside        Vishal Quinteroelor, RN  03/23/22 7941

## 2022-03-23 NOTE — ED NOTES
Dr Naomi Vergara  And security at bedside  Pt  Being restrained        Capri Conte, RN  03/23/22 4272

## 2022-03-23 NOTE — H&P
INTERNAL MEDICINE RESIDENCY ADMISSION H&P     Name: Anh Riggins   Age & Sex: 40 y o  male   MRN: [id number]  Unit/Bed#: ED 22   Encounter: [id number]  Primary Care Provider: No primary care provider on file  Code Status: Level 1 - Full Code  Admission Status: INPATIENT   Disposition: Patient requires Level 2 Step Down     Admit to team: SOD Team C     ASSESSMENT/PLAN     Active Problems:    Drug overdose, multiple drugs    History of cardiac surgery     Aspiration pneumonitis       Elevated troponin  Assessment & Plan  Patient presented with a troponin of 6 which later increased to 22 and 34  Believed to be secondary to methamphetamine use  ECG showed sinus tach  Plan:  Continue to trend troponins    Continue telemetry monitoring    Aspiration pneumonitis   Assessment & Plan  Pt presented after overdose on Methamphetamine possibly laced with fentanyl  Chest xray showed diffuse bilateral consolidations -> concerning for possible aspiration pneumonitis  Given 1g/50 mL ceftriaxone in ED  Currently on 12 L NC    Plan: Will continue to monitor off antibiotics for now -> can restart if patient shows signs of infection  Continue to monitor respiratory status   Aspiration precautions and head of bed elevation    History of cardiac surgery   Assessment & Plan  Pt has a thoracotomy scar, however no recorded surgery is chart and is a poor historian  Due to history of IV drug abuse, suspicion for heart valve replacement is high  Pt reports he is not on anticoagulation because he "ran out"  Unsure when he ran out or when he had the surgery  As per the ED, the patient's wife did not know any details of his medical history  Plan: Will start warfarin 5 mg daily for now   Monitor INR and adjust warfarin as needed    Drug overdose, multiple drugs  Assessment & Plan  -Drug of choice is methamphetamine   Snorted it this morning   -Responded well to narcan and so suspect it was possibly laced with fentanyl   -Toxicology is following   -received one dose intranasal narcan at home and one dose of IV narcan by EMS  -Chest Xray taken in ED shows bilateral consolidations with differential of aspiration vs non-cardiogenic pulmonary edema  Most likely pulmonary edema due to bilaterality   -Pt currently unwilling to start rehab, will reassess in the morning  Plan:  Narcan 0 4mg IV PRN for respiratory depression  Currently on 12 L midflow - monitor respiratory status and wean supplemental oxygen maintaining SpO2 >90%  Consider benzodiazepines or haldol for agitation  Reassess willingness for rehab in the morning  Discharge with narcan       VTE Pharmacologic Prophylaxis: Warfarin  VTE Mechanical Prophylaxis: sequential compression device    CHIEF COMPLAINT     Chief Complaint   Patient presents with    Loss of Consciousness     Per EMS found for unresponsive  Given 0 4 Narcan intranasally and woke up  Upon ED arrival pt  obtunded  SpO2 73 on room air      HISTORY OF PRESENT ILLNESS     The patient is a 43-year-old male with a past medical history of IV drug abuse and open-heart surgery (likely mechanical tricuspid valve repair but unclear at this time) who presented to the emergency department after being found unresponsive at home by his wife  His current drug of choice is methamphetamine which he admitted to snorting earlier today  At home his wife administered 1 dose of intranasal Shelley Sers showed a return of spontaneous ventilatory effort  When the EMS arrived the patient remained unresponsive and a 2nd dose of IV Narcan was administered  Following this, the patient became responsive and was reportedly extremely combative with the EMS  He was originally satting in the mid 76s on room air and he was started on a non-rebreather mask  In the emergency department he was transition to mid flow nasal cannula and eventually 12 L nasal cannula    He remained somnolent in the emergency department on exam but did respond intermittently to questioning  He admitted to snorting methamphetamine earlier in the day, he admitted to undergoing open heart surgery in the past and has been noncompliant with his medications, and he reported that he is not sure if he would like to pursue rehab following his hospitalization  When questioned about code status he refused to answer  The patient's wife was no longer with him during the encounter and her phone number could not be found  As per ED, she did not know much about the patient's past medical history  Toxicology was also present during the encounter  Upon presentation to the emergency department the patient was tachycardic with a pulse of 111, tachypneic respiratory rate in the 20s to 30s, and was satting 73% on room air  He was afebrile and blood pressure was stable  He was placed on 15 L non-rebreather mask which was later weaned down to 12 L nasal cannula and was saturating in the mid 90s  CBC and CMP were unremarkable  HS Troponin was 6 and 22 at 2 hours  Coma panel, including acetaminophen level, were negative  INR was 1 02  Urine toxicology was positive for methamphetamine  ECG showed sinus tach  Venous blood gas showed a pH 7 32, pCO2 of 52, PO2 is 66 4 and HC03 of 26 6  CXR showed diffuse bilateral lung consolidation  CT head showed no acute intracranial abnormality  Given the patient's respiratory depression and improvement with administration of naloxone, the most likely cause of his symptoms was methamphetamine use laced with an opioid (likely fentanyl)  He was admitted under step-down 2 with continuous telemetry monitoring  REVIEW OF SYSTEMS     Review of Systems   · Unable to be obtained due to patient's somnolence, however when asked if anything was bothering him he said "no"  Also when questioned if he is experiencing any chest pain or shortness of breath he responded "no"       OBJECTIVE     Vitals:    03/23/22 1600 03/23/22 1645 03/23/22 1745 22 1845   BP: 148/90 137/92 152/82 135/76   BP Location: Right arm  Right arm Right arm   Pulse: 100 (!) 109 (!) 107 (!) 108   Resp: (!) 32 (!) 32  (!) 23   Temp:       TempSrc:       SpO2: 92% 93% 92% 92%      Temperature:   Temp (24hrs), Av 3 °F (36 8 °C), Min:98 3 °F (36 8 °C), Max:98 3 °F (36 8 °C)    Temperature: 98 3 °F (36 8 °C)  Intake & Output:  I/O        0701   0700  0701   07 0701   0700    IV Piggyback   50    Total Intake   50    Net   +50               Weights: There is no height or weight on file to calculate BMI  Weight (last 2 days)     None        Physical Exam  Constitutional:       Appearance: He is well-developed  He is ill-appearing  Comments: Somnolent   HENT:      Head: Normocephalic and atraumatic  Nose: Nose normal       Comments: Dried blood in nares  Eyes:      General:         Right eye: No discharge  Left eye: No discharge  Conjunctiva/sclera: Conjunctivae normal       Pupils: Pupils are equal, round, and reactive to light  Neck:      Thyroid: No thyromegaly  Cardiovascular:      Rate and Rhythm: Normal rate and regular rhythm  Heart sounds: Normal heart sounds  No murmur heard  No friction rub  No gallop  Pulmonary:      Effort: Pulmonary effort is normal  No respiratory distress  Breath sounds: No stridor  No wheezing or rales  Comments: Coarse breath sounds  On 12 L nasal cannula  Chest:      Chest wall: No tenderness  Abdominal:      General: Bowel sounds are normal  There is no distension  Palpations: Abdomen is soft  There is no mass  Tenderness: There is no abdominal tenderness  There is no guarding or rebound  Musculoskeletal:         General: No tenderness or deformity  Right lower leg: No edema  Left lower leg: No edema  Lymphadenopathy:      Cervical: No cervical adenopathy  Skin:     General: Skin is warm and dry        Comments: Healed vertical surgical scar on sternum   Psychiatric:      Comments: Lethargic, intermittently responding to questions  PAST MEDICAL HISTORY   No past medical history on file  PAST SURGICAL HISTORY   No past surgical history on file  SOCIAL & FAMILY HISTORY     Social History     Substance and Sexual Activity   Alcohol Use Yes     Substance and Sexual Activity   Alcohol Use Yes        Substance and Sexual Activity   Drug Use Not Currently     Social History     Tobacco Use   Smoking Status Current Every Day Smoker   Smokeless Tobacco Current User     No family history on file  LABORATORY DATA     Labs: I have personally reviewed pertinent reports  Results from last 7 days   Lab Units 03/23/22  1806 03/23/22  1656 03/23/22  1314   WBC Thousand/uL  --   --  10 14   HEMOGLOBIN g/dL  --   --  15 8   I STAT HEMOGLOBIN g/dl  --  16 0  --    HEMATOCRIT %  --   --  47 0   HEMATOCRIT, ISTAT %  --  47  --    PLATELETS Thousands/uL 302  --  327   NEUTROS PCT %  --   --  64   MONOS PCT %  --   --  6      Results from last 7 days   Lab Units 03/23/22  1656 03/23/22  1314   POTASSIUM mmol/L  --  3 9   CHLORIDE mmol/L  --  108   CO2 mmol/L  --  26   CO2, I-STAT mmol/L 30  --    BUN mg/dL  --  14   CREATININE mg/dL  --  0 85   CALCIUM mg/dL  --  8 3   ALK PHOS U/L  --  87   ALT U/L  --  40   AST U/L  --  26   GLUCOSE, ISTAT mg/dl 123  --               Results from last 7 days   Lab Units 03/23/22  1806   INR  1 02             Micro:  No results found for: Qasim , WOUNDCULT, SPUTUMCULTUR  IMAGING & DIAGNOSTIC TESTS     Imaging: I have personally reviewed pertinent reports  XR chest 1 view portable    Result Date: 3/23/2022  Impression: 1  Diffuse bilateral lung consolidation  Workstation performed: [id number]     CT head without contrast    Result Date: 3/23/2022  Impression: No acute intracranial abnormality   Workstation performed: [id number]     EKG, Pathology, and Other Studies: I have personally reviewed pertinent reports  ALLERGIES     Allergies   Allergen Reactions    Vancomycin Hives     MEDICATIONS PRIOR TO ARRIVAL     Prior to Admission medications    Not on File     MEDICATIONS ADMINISTERED IN LAST 24 HOURS     Medication Administration - last 24 hours from 03/22/2022 1849 to 03/23/2022 1849       Date/Time Order Dose Route Action Action by     03/23/2022 1311 naloxone (FOR EMS ONLY) [facility]) 2 MG/2ML injection 2 mg 0 mg Does not apply Given to [facility], RN     03/23/2022 1658 piperacillin-tazobactam (ZOSYN) 3 375 g in sodium chloride 0 9 % 100 mL IVPB 3 375 g Intravenous Not Given Claude Ulysses, JAIME     03/23/2022 1752 ceftriaxone (ROCEPHIN) 1 g/50 mL in dextrose IVPB 0 mg Intravenous Stopped Tory CALIXTO Serna     03/23/2022 1702 ceftriaxone (ROCEPHIN) 1 g/50 mL in dextrose IVPB 1,000 mg Intravenous New Bag Claude Semen, RN        CURRENT MEDICATIONS     Current Facility-Administered Medications   Medication Dose Route Frequency Provider Last Rate    acetaminophen  650 mg Oral Q6H PRN Vinny Paul MD      naloxone  0 04 mg Intravenous Q1MIN PRN Vinny Paul MD     [facility] nicotine  1 patch Transdermal Daily Vinny Paul MD      ondansetron  4 mg Intravenous Q6H PRN Vinny Paul MD      warfarin  5 mg Oral Daily (warfarin) Vinny Paul MD          acetaminophen, 650 mg, Q6H PRN  naloxone, 0 04 mg, Q1MIN PRN  ondansetron, 4 mg, Q6H PRN        Admission Time  I spent 1 hour admitting the patient  This involved direct patient contact where I performed a full history and physical, reviewing previous records, and reviewing laboratory and other diagnostic studies  Portions of the record may have been created with voice recognition software  Occasional wrong word or "sound a like" substitutions may have occurred due to the inherent limitations of voice recognition software    Read the chart carefully and recognize, using context, where substitutions have occurred     ==  Ha Hooks MD  [address]  Internal Medicine Residency PGY-2

## 2022-03-23 NOTE — CONSULTS
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 mechanical valve replacement--non-compliant with anticoagulation    RECOMMENDATIONS:  Patient endorses methamphetamine use, and I suspect his product was contaminated with fentanyl or an alternative opioid/analog causing respiratory depression given his initial response to naloxone  Patient denies recent opioid use and would therefore be opioid naive, putting him at risk for recurrent respiratory depression, as the half life of naloxone likely is shorter than the opioid  1  Continue supportive care with airway monitoring, head of bed elevation, aspiration precautions  2  Naloxone 0 4 mg IV as needed for respiratory depression    3  Continue supplemental oxygen as needed for hypoxia; patient's CXR shows bilateral consolidation with differential of aspiration vs non-cardiogenic pulmonary edema  Given bilateral nature and history, favor non-cardiogenic pulmonary edema  Patient may benefit from mid-high flow O2 or BiPAP if tolerated  4  Benzodiazepines as needed for agitation; alternatively, can consider haloperidol if significant psychomotor agitation and/or choreoathetosis  Use with caution if patient is exhibiting respiratory depression  5  Monitor for rhabdomyolysis and renal dysfunction    6  Case management consultation for resources--patient states "I have too much going on right now for rehab "    7  Please discharge the patient with naloxone for harm reduction      For further questions, please contact the medical  on call via Tiger Text  Please see additional teaching note below (if available)    [facility] Toxicology   [address]  Sympathomimetic Toxicity     Sympathomimetic toxicity is diagnosed clinically by tachycardia, hypertension, mydriasis, diaphoresis, agitation, and hyperthermia secondary to exposure to sympathomimetic agent, such as cocaine or amphetamine/methamphetamines  This causes overstimulation of central nervous system resulting in increased catecholamine release  Primary treatment includes benzodiazepines to suppress central nervous system stimulation  If not treated aggressively enough, hyperthermia and agitation may result in disseminated intravascular coagulation and renal injury, respectively  If hyperthermia occurs and is refractory to aggressive treatment with benzodiazepines, intravenous fluids and external cooling, then intubation and paralysis are necessary  If renal injury is present, patient should be assessed for rhabdomyolysis  In addition, cardiac concerns include ischemia and sodium channel blockade  Untreated tachycardia can result in demand ischemia, especially if there is any underlying coronary artery disease  Na channel blockade is demonstrated by QRS widening on ECG and is treated with sodium bicarbonate  Treatment of these issues should be in conjunction with a medical       Medical Toxicology  [address]  Discussion: Opioids/Opiates     Introduction: Opiates are naturally occurring compounds derived from the juice of the poppy Papaver somniferum while opioids are these as well as synthetically derived opiate analogs  They are used predominately as pain medications and are frequently abused  Metabolism/Pharmacokinetics: Opioids stimulate opiate receptors in the CNS causing sedation and respiratory depression    Death typically results from respiratory failure secondary to apnea or aspiration  Peak effect typically occurs in 2-3 hours after ingestion by may be delayed secondary to decreased gastric motility or extended release preparations  Rates of elimination vary widely from 1 to 30 hours depending on the formulation  Toxic Dose:  Varies widely based on the medication and the patients tolerance to the drug  Clinical Presentation:      Mild-Moderate Overdose:  Lethargy, pinpoint pupils, decreased blood pressure, decreased heart rate, decreased bowel sounds, muscle laxity  Severe Overdose:  Coma, respiratory depression, apnea, noncardiogenic pulmonary edema, death  Specific Agents:  Codeine, dextromethorphan, meperiding, propoxyphene, and tramadol may cause seizures  Propoxyphene may produce cardiotoxicity  Withdrawal symptoms:  While the withdrawal symptoms make may the patient wish they were dead, they are not fatal   Symptoms include anxiety, piloerection, abdominal cramps, diarrhea, and insomnia  Diagnosis:  The diagnosis of opioid intoxication/withdrawal is typically clinical   While a urine drug screen can detect morphine it is unable to detect synthetic opioids and the utility of the UDS in adult patients is limited  Treatment:    1) The key intervention necessary to avoid morbidity/mortality in the opioid overdose patient is appropriate airway/respiratory management  2) Naloxone (Narcan) can be used to reverse an overdose but should be used cautiously in opioid dependent patient (0 2 to 0 4 mg IV, repeated to desired effect) given the potential for withdrawal symptoms or unmasking of a co-ingestant such as a sympathomimetic  Naloxone has a half-fife of only 1-2 hours (less than many narcotics) so the patient must be monitored closely for sedation/apnea for 4 hours after the last dose of narcan      Decontamination:  Activated charcoal may be used in acute ingestions but the risk of aspiration limits its use in the non-intubated patient  References    Opiates and Opioids  Jacquelinelucero MoseleyMarc  In [facility], ed  [address]: [place], 2004: pp  [phone]  Hx and PE limited by: patient cooperation    HPI: Danielle Roberts is a 40y o  year old male who presents with encephalopathy and hypoxia  Patient found unresponsive at home; responded to intra-nasal naloxone  Received additional dose of IV naloxone  Patient hypoxic and placed on O2  CXR with bilateral consolidations  Laboratory studies are grossly normal, and coma panel is negative  Patient states he was using methamphetamine  Denies current opioid use  Denies benzodiazepine, alcohol, and other drug use  Will not answer ROS questions, stating he has to urinate  Review of Systems   Unable to perform ROS: Other (Patient uncooperative)       Historical Information   No past medical history on file  No past surgical history on file  Social History   Social History     Substance and Sexual Activity   Alcohol Use Yes     Social History     Substance and Sexual Activity   Drug Use Not Currently     Social History     Tobacco Use   Smoking Status Current Every Day Smoker   Smokeless Tobacco Current User     No family history on file       Prior to Admission medications    Not on File       Current Facility-Administered Medications   Medication Dose Route Frequency    ceftriaxone (ROCEPHIN) 1 g/50 mL in dextrose IVPB  1,000 mg Intravenous Once       Allergies   Allergen Reactions    Vancomycin Hives       Objective     No intake or output data in the 24 hours ending 03/23/22 1723    Invasive Devices:   Peripheral IV 03/23/22 Right Antecubital (Active)       Peripheral IV 03/23/22 Left Forearm (Active)       Vitals   Vitals:    03/23/22 1315 03/23/22 1515 03/23/22 1600 03/23/22 1645   BP: 132/87 153/86 148/90 137/92   TempSrc:       Pulse: 101 99 100 (!) 109   Resp: (!) 31  (!) 32 (!) 32   Patient Position - Orthostatic VS: Lying Lying Lying Lying   Temp: Physical Exam  Vitals and nursing note reviewed  Constitutional:       General: He is in acute distress  Appearance: He is ill-appearing  Comments: Somnolent but arouses to voice  More conversant over time  Agitated   HENT:      Head: Normocephalic  Comments: Blood at nares     Nose: Nose normal       Mouth/Throat:      Mouth: Mucous membranes are moist    Eyes:      General: No scleral icterus  Pupils: Pupils are equal, round, and reactive to light  Cardiovascular:      Rate and Rhythm: Regular rhythm  Tachycardia present  Heart sounds: Murmur (tricuspid) heard  Comments: midsternal sternotomy scar  Pulmonary:      Effort: No respiratory distress  Breath sounds: Rhonchi (diffuse, bilaterally) present  Abdominal:      General: There is no distension  Musculoskeletal:         General: No swelling or deformity  Cervical back: Neck supple  Skin:     General: Skin is warm and dry  Comments: Numerous tattoos   Neurological:      General: No focal deficit present  Comments: Agitated   Psychiatric:      Comments: Agitated, uncooperative with care         EKG, Pathology, and Other Studies: I have personally reviewed pertinent reports  Lab Results: I have personally reviewed pertinent reports        Labs:  Results from last 7 days   Lab Units 03/23/22  1656 03/23/22  1314   WBC Thousand/uL  --  10 14   HEMOGLOBIN g/dL  --  15 8   I STAT HEMOGLOBIN g/dl 16 0  --    HEMATOCRIT %  --  47 0   HEMATOCRIT, ISTAT % 47  --    PLATELETS Thousands/uL  --  327   NEUTROS PCT %  --  64   LYMPHS PCT %  --  27   MONOS PCT %  --  6      Results from last 7 days   Lab Units 03/23/22  1656 03/23/22  1314   POTASSIUM mmol/L  --  3 9   CHLORIDE mmol/L  --  108   CO2 mmol/L  --  26   CO2, I-STAT mmol/L 30  --    BUN mg/dL  --  14   CREATININE mg/dL  --  0 85   CALCIUM mg/dL  --  8 3   ALK PHOS U/L  --  87   ALT U/L  --  40   AST U/L  --  26   GLUCOSE, ISTAT mg/dl 123  -- No results found for: TROPONINI  Results from last 7 days   Lab Units 03/23/22  1314   PH LINDY  7 326   PCO2 LINDY mm Hg 52 0*   PO2 LINDY mm Hg 66 4*   HCO3 LINDY mmol/L 26 6   O2 CONTENT LINDY ml/dL 21 0   O2 HGB, VENOUS % 90 5*     Results from last 7 days   Lab Units 03/23/22  1314   ACETAMINOPHEN LVL ug/mL <2*   ETHANOL LVL mg/dL <3   SALICYLATE LVL mg/dL <3*     Invalid input(s): EXTPREGUR    Imaging Studies: I have personally reviewed pertinent reports  Counseling / Coordination of Care  Total floor / unit time spent today 25 minutes  Greater than 50% of total time was spent with the patient and / or family counseling and / or coordination of care

## 2022-03-24 ENCOUNTER — APPOINTMENT (INPATIENT)
Dept: NON INVASIVE DIAGNOSTICS | Facility: HOSPITAL | Age: 42
DRG: 812 | End: 2022-03-24
Payer: COMMERCIAL

## 2022-03-24 VITALS
RESPIRATION RATE: 18 BRPM | OXYGEN SATURATION: 94 % | SYSTOLIC BLOOD PRESSURE: 119 MMHG | BODY MASS INDEX: 25.71 KG/M2 | TEMPERATURE: 98.5 F | HEART RATE: 84 BPM | WEIGHT: 194 LBS | DIASTOLIC BLOOD PRESSURE: 71 MMHG | HEIGHT: 73 IN

## 2022-03-24 LAB
ALBUMIN SERPL BCP-MCNC: 3 G/DL (ref 3.5–5)
ALP SERPL-CCNC: 67 U/L (ref 46–116)
ALT SERPL W P-5'-P-CCNC: 37 U/L (ref 12–78)
ANION GAP SERPL CALCULATED.3IONS-SCNC: 5 MMOL/L (ref 4–13)
AORTIC ROOT: 3.8 CM
AORTIC VALVE MEAN VELOCITY: 7.7 M/S
APICAL FOUR CHAMBER EJECTION FRACTION: 51 %
ASCENDING AORTA: 3.6 CM (ref 2.1–3.14)
AST SERPL W P-5'-P-CCNC: 18 U/L (ref 5–45)
AV LVOT MEAN GRADIENT: 3 MMHG
AV LVOT PEAK GRADIENT: 5 MMHG
AV MEAN GRADIENT: 3 MMHG
AV PEAK GRADIENT: 4 MMHG
AV VELOCITY RATIO: 1.09
BASOPHILS # BLD AUTO: 0.08 THOUSANDS/ΜL (ref 0–0.1)
BASOPHILS NFR BLD AUTO: 0 % (ref 0–1)
BILIRUB SERPL-MCNC: 1.08 MG/DL (ref 0.2–1)
BUN SERPL-MCNC: 14 MG/DL (ref 5–25)
CALCIUM ALBUM COR SERPL-MCNC: 9.7 MG/DL (ref 8.3–10.1)
CALCIUM SERPL-MCNC: 8.9 MG/DL (ref 8.3–10.1)
CHLORIDE SERPL-SCNC: 104 MMOL/L (ref 100–108)
CO2 SERPL-SCNC: 28 MMOL/L (ref 21–32)
CREAT SERPL-MCNC: 0.89 MG/DL (ref 0.6–1.3)
DOP CALC AO PEAK VEL: 1.05 M/S
DOP CALC AO VTI: 18.1 CM
DOP CALC LVOT PEAK VEL VTI: 19.28 CM
DOP CALC LVOT PEAK VEL: 1.14 M/S
E WAVE DECELERATION TIME: 226 MS
EOSINOPHIL # BLD AUTO: 0.07 THOUSAND/ΜL (ref 0–0.61)
EOSINOPHIL NFR BLD AUTO: 0 % (ref 0–6)
ERYTHROCYTE [DISTWIDTH] IN BLOOD BY AUTOMATED COUNT: 12.6 % (ref 11.6–15.1)
FRACTIONAL SHORTENING: 20 % (ref 28–44)
GFR SERPL CREATININE-BSD FRML MDRD: 106 ML/MIN/1.73SQ M
GLUCOSE SERPL-MCNC: 127 MG/DL (ref 65–140)
HCT VFR BLD AUTO: 45 % (ref 36.5–49.3)
HGB BLD-MCNC: 15.1 G/DL (ref 12–17)
IMM GRANULOCYTES # BLD AUTO: 0.1 THOUSAND/UL (ref 0–0.2)
IMM GRANULOCYTES NFR BLD AUTO: 1 % (ref 0–2)
INR PPP: 1.19 (ref 0.84–1.19)
INTERVENTRICULAR SEPTUM IN DIASTOLE (PARASTERNAL SHORT AXIS VIEW): 1.1 CM
INTERVENTRICULAR SEPTUM: 1.1 CM (ref 0.55–1.03)
LAAS-AP4: 19.7 CM2
LEFT ATRIUM SIZE: 3.7 CM
LEFT INTERNAL DIMENSION IN SYSTOLE: 4.5 CM (ref 3.41–5.16)
LEFT VENTRICULAR INTERNAL DIMENSION IN DIASTOLE: 5.6 CM (ref 5.64–8.4)
LEFT VENTRICULAR POSTERIOR WALL IN END DIASTOLE: 1.1 CM (ref 0.53–1.01)
LEFT VENTRICULAR STROKE VOLUME: 64 ML
LVSV (TEICH): 64 ML
LYMPHOCYTES # BLD AUTO: 2.11 THOUSANDS/ΜL (ref 0.6–4.47)
LYMPHOCYTES NFR BLD AUTO: 10 % (ref 14–44)
MAGNESIUM SERPL-MCNC: 2.2 MG/DL (ref 1.6–2.6)
MCH RBC QN AUTO: 29.5 PG (ref 26.8–34.3)
MCHC RBC AUTO-ENTMCNC: 33.6 G/DL (ref 31.4–37.4)
MCV RBC AUTO: 88 FL (ref 82–98)
MONOCYTES # BLD AUTO: 1.12 THOUSAND/ΜL (ref 0.17–1.22)
MONOCYTES NFR BLD AUTO: 5 % (ref 4–12)
MV E'TISSUE VEL-SEP: 8 CM/S
MV PEAK A VEL: 0.67 M/S
MV PEAK E VEL: 60 CM/S
MV STENOSIS PRESSURE HALF TIME: 66 MS
MV VALVE AREA P 1/2 METHOD: 3.33 CM2
NEUTROPHILS # BLD AUTO: 17.8 THOUSANDS/ΜL (ref 1.85–7.62)
NEUTS SEG NFR BLD AUTO: 84 % (ref 43–75)
NRBC BLD AUTO-RTO: 0 /100 WBCS
PHOSPHATE SERPL-MCNC: 2.7 MG/DL (ref 2.7–4.5)
PLATELET # BLD AUTO: 269 THOUSANDS/UL (ref 149–390)
PMV BLD AUTO: 9.1 FL (ref 8.9–12.7)
POTASSIUM SERPL-SCNC: 3.6 MMOL/L (ref 3.5–5.3)
PROT SERPL-MCNC: 6.5 G/DL (ref 6.4–8.2)
PROTHROMBIN TIME: 14.7 SECONDS (ref 11.6–14.5)
RBC # BLD AUTO: 5.11 MILLION/UL (ref 3.88–5.62)
RIGHT VENTRICLE ID DIMENSION: 4.3 CM
SL CV LV EF: 50
SL CV PED ECHO LEFT VENTRICLE DIASTOLIC VOLUME (MOD BIPLANE) 2D: 155 ML
SL CV PED ECHO LEFT VENTRICLE SYSTOLIC VOLUME (MOD BIPLANE) 2D: 90 ML
SODIUM SERPL-SCNC: 137 MMOL/L (ref 136–145)
TR MAX PG: 32 MMHG
TR PEAK VELOCITY: 2.8 M/S
TRICUSPID VALVE PEAK REGURGITATION VELOCITY: 2.83 M/S
WBC # BLD AUTO: 21.28 THOUSAND/UL (ref 4.31–10.16)
Z-SCORE OF ASCENDING AORTA: 3.73
Z-SCORE OF INTERVENTRICULAR SEPTUM IN END DIASTOLE: 2.57
Z-SCORE OF LEFT VENTRICULAR DIMENSION IN END DIASTOLE: -2.06
Z-SCORE OF LEFT VENTRICULAR DIMENSION IN END SYSTOLE: 0.55
Z-SCORE OF LEFT VENTRICULAR POSTERIOR WALL IN END DIASTOLE: 2.68

## 2022-03-24 PROCEDURE — NC001 PR NO CHARGE: Performed by: INTERNAL MEDICINE

## 2022-03-24 PROCEDURE — 97161 PT EVAL LOW COMPLEX 20 MIN: CPT

## 2022-03-24 PROCEDURE — 97166 OT EVAL MOD COMPLEX 45 MIN: CPT

## 2022-03-24 PROCEDURE — 83735 ASSAY OF MAGNESIUM: CPT | Performed by: STUDENT IN AN ORGANIZED HEALTH CARE EDUCATION/TRAINING PROGRAM

## 2022-03-24 PROCEDURE — 93306 TTE W/DOPPLER COMPLETE: CPT | Performed by: INTERNAL MEDICINE

## 2022-03-24 PROCEDURE — 85025 COMPLETE CBC W/AUTO DIFF WBC: CPT | Performed by: STUDENT IN AN ORGANIZED HEALTH CARE EDUCATION/TRAINING PROGRAM

## 2022-03-24 PROCEDURE — 80053 COMPREHEN METABOLIC PANEL: CPT | Performed by: STUDENT IN AN ORGANIZED HEALTH CARE EDUCATION/TRAINING PROGRAM

## 2022-03-24 PROCEDURE — 99222 1ST HOSP IP/OBS MODERATE 55: CPT | Performed by: INTERNAL MEDICINE

## 2022-03-24 PROCEDURE — 84100 ASSAY OF PHOSPHORUS: CPT | Performed by: STUDENT IN AN ORGANIZED HEALTH CARE EDUCATION/TRAINING PROGRAM

## 2022-03-24 PROCEDURE — 85610 PROTHROMBIN TIME: CPT | Performed by: STUDENT IN AN ORGANIZED HEALTH CARE EDUCATION/TRAINING PROGRAM

## 2022-03-24 PROCEDURE — 93306 TTE W/DOPPLER COMPLETE: CPT

## 2022-03-24 RX ORDER — NALOXONE HYDROCHLORIDE 4 MG/.1ML
SPRAY NASAL
Qty: 1 EACH | Refills: 0 | Status: SHIPPED | OUTPATIENT
Start: 2022-03-24

## 2022-03-24 RX ORDER — POTASSIUM CHLORIDE 20 MEQ/1
40 TABLET, EXTENDED RELEASE ORAL ONCE
Status: COMPLETED | OUTPATIENT
Start: 2022-03-24 | End: 2022-03-24

## 2022-03-24 RX ORDER — AMOXICILLIN AND CLAVULANATE POTASSIUM 875; 125 MG/1; MG/1
1 TABLET, FILM COATED ORAL EVERY 12 HOURS SCHEDULED
Qty: 10 TABLET | Refills: 0 | Status: SHIPPED | OUTPATIENT
Start: 2022-03-24 | End: 2022-03-29

## 2022-03-24 RX ADMIN — POTASSIUM CHLORIDE 40 MEQ: 1500 TABLET, EXTENDED RELEASE ORAL at 09:07

## 2022-03-24 NOTE — PHYSICAL THERAPY NOTE
Physical Therapy Evaluation    Patient's Name: Reese Henderson    Admitting Diagnosis  Overdose [T50 901A]  Polysubstance overdose [T50 901A]    Problem List  Patient Active Problem List   Diagnosis    Drug overdose, multiple drugs    History of cardiac surgery     Aspiration pneumonitis     Elevated troponin       Past Medical History  No past medical history on file  Past Surgical History  No past surgical history on file      03/24/22 1207   PT Last Visit   PT Visit Date 03/24/22   Note Type   Note type Evaluation   Pain Assessment   Pain Assessment Tool 0-10   Pain Score No Pain   Restrictions/Precautions   Weight Bearing Precautions Per Order No   Other Precautions Telemetry   Home Living   Type of [address] [place] [address] One level  (0 KATHY)   Bathroom Shower/Tub Tub/shower unit   Bathroom Toilet Standard   Prior Function   Level of [place] Independent with ADLs and functional mobility   Lives With Mo Help From Family   ADL Assistance Independent   IADLs Independent   Falls in the last 6 months 0   Vocational Full time employment  ()   Comments PTA pt I + active  General   Family/Caregiver Present No   Cognition   Overall Cognitive Status WFL   Attention Within functional limits   Orientation Level Oriented X4   Memory Within functional limits   Following Commands Follows all commands and directions without difficulty   Subjective   Subjective Pt agreeable to mobilize  RLE Assessment   RLE Assessment WNL   LLE Assessment   LLE Assessment WNL   Coordination   Movements are Fluid and Coordinated 1   Bed Mobility   Supine to Sit 6  Modified independent   Additional items HOB elevated   Sit to Supine 6  Modified independent   Additional Comments Pt greeted in supine     Transfers   Sit to Stand 6  Modified independent   Stand to Sit 6  Modified independent   Additional Comments no AD   Ambulation/Elevation   Gait pattern WNL   Gait Assistance 7  Independent   Assistive Device None   Distance 150'   Balance   Static Sitting Normal   Dynamic Sitting Normal   Static Standing Good   Dynamic Standing Fair +   Ambulatory Fair   Endurance Deficit   Endurance Deficit No   Activity Tolerance   Activity Tolerance Patient tolerated treatment well   Medical Staff Made Aware OT Jennifer + RN Jose Gee   Nurse Made Aware yes - cleared pt for PT + updated at end of session   Assessment   Assessment Pt is 40 y o  male seen for a PT evaluation s/p admit to [facility] on 3/23/2022  Pt presenting w/ drug overdose  Other active hospital problem list significant for: hx of cardiac surgery, aspiration pneumonitis, + elevated troponin  PT now consulted to assess functional mobility and needs for safe d/c planning  Prior to admission, pt was I + active  Currently pt is [place] for bed skills; [place] for functional transfers; I for ambulation w/o AD  Pt presents functioning at baseline  No further acute PT needs  Will sign off  Goals   Patient Goals go home today   PT Treatment Day 0   Plan   PT Frequency   (eval only - d/c PT)   Recommendation   PT Discharge Recommendation No rehabilitation needs   AM-PAC Basic Mobility Inpatient   Turning in Bed Without Bedrails 4   Lying on Back to Sitting on Edge of Flat Bed 4   Moving Bed to Chair 4   Standing Up From Chair 4   Walk in Room 4   Climb 3-5 Stairs 4   Basic Mobility Inpatient Raw Score 24   Basic Mobility Standardized Score 57 68   Highest Level Of Mobility   [facility] Goal 8: Walk 250 feet or more   End of Consult   Patient Position at End of Consult Supine; All needs within reach  (all lines in tact)     Tameka Case, PT, DPT

## 2022-03-24 NOTE — UTILIZATION REVIEW
Initial Clinical Review    Admission: Date/Time/Statement:   Admission Orders (From admission, onward)     Ordered        03/23/22 1658  Inpatient Admission  Once                      Orders Placed This Encounter   Procedures    Inpatient Admission     Standing Status:   Standing     Number of Occurrences:   1     Order Specific Question:   Level of Care     Answer:   Level 2 Stepdown / HOT [14]     Order Specific Question:   Estimated length of stay     Answer:   More than 2 Midnights     Order Specific Question:   Certification     Answer:   I certify that inpatient services are medically necessary for this patient for a duration of greater than two midnights  See H&P and MD Progress Notes for additional information about the patient's course of treatment  ED Arrival Information     Expected Arrival Acuity    - 3/23/2022 12:58 Emergent         Means of arrival Escorted by Service Admission type    [facility]         Arrival complaint    overdose        Chief Complaint   Patient presents with    Loss of Consciousness     Per EMS found for unresponsive  Given 0 4 Narcan intranasally and woke up  Upon ED arrival pt  obtunded  SpO2 73 on room air       Initial Presentation: 40 y o  male with PMH of IV drug abuse and open-heart surgery (likely mechanical tricuspid valve repair) [facility] via EMS with c/o after being found unresponsive at home by his wife  Wife administered 1 dose of intranasal Narcan and pt showed a return of spontaneous ventilatory effort  When the EMS arrived the patient remained unresponsive and a 2nd dose of IV Narcan was administered  Following this, the patient became responsive and was reportedly extremely combative with the EMS  He was originally satting in the mid 76s on room air and he was started on a non-rebreather mask  In ED, he was transition to mid flow nasal cannula and eventually 12 L nasal cannula   He admitted to snorting methamphetamine earlier in the day  [address] drug overdose, elevated trop:  IV Narcan prn, continue O2 12 L and wean as able, possible benzos or haldol for agitation, assess willingness for rehab, continue to trend trops and continue telemetry monitoring, monitor resp status, start on warfarin dt cardiac sx and monitor INR, monitor possible aspiration  Date: 3/24   Day 2:   Discharged to home  Pt will f/u at [facility]      ED Triage Vitals   Temperature Pulse Respirations Blood Pressure SpO2   03/23/22 1301 03/23/22 1301 03/23/22 1301 03/23/22 1301 03/23/22 1301   98 3 °F (36 8 °C) (!) 111 22 102/68 (!) 73 %      Temp Source Heart Rate Source Patient Position - Orthostatic VS BP Location FiO2 (%)   03/23/22 1301 03/23/22 1301 03/23/22 1315 03/23/22 1301 --   Tympanic Monitor Lying Right arm       Pain Score       03/23/22 2300       No Pain          Wt Readings from Last 1 Encounters:   03/24/22 88 kg (194 lb)     Additional Vital Signs:   Date/Time Temp Pulse Resp BP MAP (mmHg) SpO2 Calculated FIO2 (%) - Nasal Cannula O2 Flow Rate (L/min) Nasal Cannula O2 Flow Rate (L/min) O2 Device Patient Position - Orthostatic VS   03/24/22 1120 -- 84 -- 119/71 -- -- -- -- -- -- --   03/24/22 0900 -- 84 -- -- -- 94 % -- -- -- None (Room air) --   03/24/22 0800 -- 86 -- -- -- 95 % 24 -- 1 L/min -- --   03/24/22 0751 -- 114 Abnormal  -- -- -- -- -- -- -- -- --   03/24/22 0744 98 5 °F (36 9 °C) 86 -- 119/71 91 91 % 32 -- 3 L/min Nasal cannula --   03/24/22 0400 98 7 °F (37 1 °C) -- 18 132/98 115 -- -- -- -- -- --   03/23/22 2300 97 8 °F (36 6 °C) 89 -- 113/74 90 95 % -- -- -- Nasal cannula --   03/23/22 2247 -- 94 22 112/67 -- 96 % -- -- -- -- Lying   03/23/22 2104 -- 91 22 119/74 -- 94 % -- -- -- Nasal cannula Lying   03/23/22 2100 -- 89 22 119/74 -- 94 % -- -- -- Mid flow nasal cannula Lying   03/23/22 2050 -- -- -- -- -- -- 44 -- 6 L/min Nasal cannula --   03/23/22 2033 -- -- -- -- -- 96 % -- 12 L/min -- Mid flow nasal cannula --   03/23/22 2000 -- 95 22 124/83 -- 96 % -- -- -- Mid flow nasal cannula Lying   03/23/22 1900 -- 102 22 131/76 98 94 % -- 12 L/min -- Mid flow nasal cannula Lying   03/23/22 1845 -- 108 Abnormal  23 Abnormal  135/76 101 92 % -- -- -- Mid flow nasal cannula Lying   03/23/22 1745 -- 107 Abnormal  -- 152/82 108 92 % -- 12 L/min -- Mid flow nasal cannula Lying   03/23/22 1645 -- 109 Abnormal  32 Abnormal  137/92 107 93 % -- 12 L/min -- Mid flow nasal cannula Lying   03/23/22 1600 -- 100 32 Abnormal  148/90 113 92 % -- 12 L/min -- Mid flow nasal cannula Lying   03/23/22 1539 -- -- -- -- -- 92 % -- 12 L/min -- Mid flow nasal cannula --   03/23/22 1515 -- 99 -- 153/86 113 94 % -- 15 L/min -- Non-rebreather mask Lying   03/23/22 1315 -- 101 31 Abnormal  132/87 105 91 % -- -- -- Non-rebreather mask Lying     Pertinent Labs/Diagnostic Test Results:   3/23 EKG:  Sinus tachycardia  Possible Left atrial enlargement  Incomplete right bundle branch block  Anterior infarct , age undetermined  Abnormal ECG    CT head without contrast   Final Result by Shae Echevarria MD (03/23 1500)      No acute intracranial abnormality  XR chest 1 view portable   Final Result by Dillon Goncalves MD (03/23 1359)      1  Diffuse bilateral lung consolidation       Results from last 7 days   Lab Units 03/24/22  0611 03/23/22  1806 03/23/22  1656 03/23/22  1314   WBC Thousand/uL 21 28*  --   --  10 14   HEMOGLOBIN g/dL 15 1  --   --  15 8   I STAT HEMOGLOBIN g/dl  --   --  16 0  --    HEMATOCRIT % 45 0  --   --  47 0   HEMATOCRIT, ISTAT %  --   --  47  --    PLATELETS Thousands/uL 269 302  --  327   NEUTROS ABS Thousands/µL 17 80*  --   --  6 47     Results from last 7 days   Lab Units 03/24/22  0611 03/23/22  1656 03/23/22  1314   SODIUM mmol/L 137  --  139   POTASSIUM mmol/L 3 6  --  3 9   CHLORIDE mmol/L 104  --  108   CO2 mmol/L 28  --  26   CO2, I-STAT mmol/L  --  30  --    ANION GAP mmol/L 5  --  5   BUN mg/dL 14  --  14   CREATININE mg/dL 0 89  --  0 85   EGFR ml/min/1 73sq m 106  --  108   CALCIUM mg/dL 8 9  --  8 3   CALCIUM, IONIZED, ISTAT mmol/L  --  1 25  --    MAGNESIUM mg/dL 2 2  --   --    PHOSPHORUS mg/dL 2 7  --   --      Results from last 7 days   Lab Units 03/24/22  0611 03/23/22  1314   AST U/L 18 26   ALT U/L 37 40   ALK PHOS U/L 67 87   TOTAL PROTEIN g/dL 6 5 6 5   ALBUMIN g/dL 3 0* 3 3*   TOTAL BILIRUBIN mg/dL 1 08* 0 42     Results from last 7 days   Lab Units 03/24/22  0611 03/23/22  1314   GLUCOSE RANDOM mg/dL 127 167*     Results from last 7 days   Lab Units 03/23/22  1314   PH LINDY  7 326   PCO2 LINDY mm Hg 52 0*   PO2 LINDY mm Hg 66 4*   HCO3 LIDNY mmol/L 26 6   BASE EXC LINDY mmol/L -0 4   O2 CONTENT LINDY ml/dL 21 0   O2 HGB, VENOUS % 90 5*     Results from last 7 days   Lab Units 03/23/22  1656   PH, LINDY I-STAT  7 323   PCO2, LINDY ISTAT mm HG 54 6*   PO2, LINDY ISTAT mm HG 35 0   HCO3, LINDY ISTAT mmol/L 28 3   I STAT BASE EXC mmol/L 1   I STAT O2 SAT % 62     Results from last 7 days   Lab Units 03/23/22  2135 03/23/22  1806 03/23/22  1534 03/23/22  1325   HS TNI 0HR ng/L 32  --   --  6   HS TNI 2HR ng/L  --   --  22  --    HSTNI D2 ng/L  --   --  16  --    HS TNI 4HR ng/L  --  34  --   --    HSTNI D4 ng/L  --  28*  --   --      Results from last 7 days   Lab Units 03/24/22  0611 03/23/22  1806   PROTIME seconds 14 7* 13 0   INR  1 19 1 02     Results from last 7 days   Lab Units 03/23/22  1806   AMPH/METH  Positive*   BARBITURATE UR  Negative   BENZODIAZEPINE UR  Negative   COCAINE UR  Negative   METHADONE URINE  Negative   OPIATE UR  Negative   PCP UR  Negative   THC UR  Negative     Results from last 7 days   Lab Units 03/23/22  1314   ETHANOL LVL mg/dL <3   ACETAMINOPHEN LVL ug/mL <2*   SALICYLATE LVL mg/dL <3*     ED Treatment:   Medication Administration from 03/23/2022 1258 to 03/23/2022 5051       Date/Time Order Dose Route Action     03/23/2022 1311 naloxone (FOR EMS ONLY) [facility]) 2 MG/2ML injection 2 mg 0 mg Does not apply Given to EMS     03/23/2022 1702 ceftriaxone (ROCEPHIN) 1 g/50 mL in dextrose IVPB 1,000 mg Intravenous New Bag     03/23/2022 1902 warfarin (COUMADIN) tablet 5 mg 5 mg Oral Given        No past medical history on file  Present on Admission:  **None**      Admitting Diagnosis: Overdose [T50 901A]  Polysubstance overdose [T50 901A]  Age/Sex: 40 y o  male  Admission Orders:  Scheduled Medications:  nicotine, 1 patch, Transdermal, Daily  warfarin, 5 mg, Oral, Daily (warfarin)      Continuous IV Infusions: none     PRN Meds:  acetaminophen, 650 mg, Oral, Q6H PRN  naloxone, 0 04 mg, Intravenous, Q1MIN PRN  ondansetron, 4 mg, Intravenous, Q6H PRN    scd  PT/OT eval    IP CONSULT TO TOXICOLOGY  IP CONSULT TO CASE MANAGEMENT    Network Utilization Review Department  ATTENTION: Please call with any questions or concerns to [phone] and carefully listen to the prompts so that you are directed to the right person  All voicemails are confidential   [place] all requests for admission clinical reviews, approved or denied determinations and any other requests to dedicated fax number below belonging to the campus where the patient is receiving treatment   List of dedicated fax numbers for the Facilities:  [address] DENIALS (Administrative/Medical Necessity) [phone]   [address] (Maternity/NICU/Pediatrics) [phone]   [address] [place] [address] [place] Anneliese Phelpsoniel John [address] [address]  Miguel [place] [address]  [place] [id number] [place] [address] [phone]

## 2022-03-24 NOTE — OCCUPATIONAL THERAPY NOTE
Occupational Therapy Evaluation     Patient Name: Jarod Ortiz  IIUVG'U Date: 3/24/2022  Problem List  Principal Problem:    Drug overdose, multiple drugs  Active Problems:    History of cardiac surgery     Aspiration pneumonitis     Elevated troponin    Past Medical History  No past medical history on file    Past Surgical History  No past surgical history on file           03/24/22 1150   OT Last Visit   OT Visit Date 03/24/22   Note Type   Note type Evaluation   Restrictions/Precautions   Weight Bearing Precautions Per Order No   Other Precautions Telemetry   Pain Assessment   Pain Assessment Tool 0-10   Pain Score No Pain   Home Living   Type of Home House   Home Layout One level-first floor set-up  (No KATHY )   Bathroom Shower/Tub Tub/shower unit   Bathroom Toilet Standard   Bathroom Equipment   (No DME at baseline)   [address]   (No AD at baseline)   Prior Function   Level of [place] Independent with ADLs and functional mobility   Lives With Mo Help From Family  (per chart -spouse, brother )   ADL Assistance Independent   IADLs Independent   Falls in the last 6 months 0   Vocational Full time employment   Lifestyle   Autonomy I with ADl's/IADL's, no AD with functional mobility, +drives   Reciprocal Relationships spouse, brother   Service to Others full time employement as a    Intrinsic Gratification hunting   Psychosocial   Psychosocial (WDL) WDL   ADL   Eating Assistance 7  Independent   Grooming Assistance 7  Independent   UB Bathing Assistance 5  Supervision/Setup   LB Bathing Assistance 5  Supervision/Setup   UB Dressing Assistance 5  Supervision/Setup   LB Dressing Assistance 5  Supervision/Setup   LB Dressing Deficit   (pt able to don/doff socks with crossed over leg method independently)   Toileting Assistance  5  Supervision/Setup   Bed Mobility   Supine to Sit 6  Modified independent   Additional items Assist x 1;HOB elevated   Sit to Supine 6  Modified independent   Additional items Assist x 1   Transfers   Sit to Stand 6  Modified independent   Additional items Assist x 1   Stand to Sit 6  Modified independent   Additional items Assist x 1   Functional Mobility   Functional Mobility 6  Modified independent   Additional Comments mod I without AD , no LOB/MUÑOZ-good activity tolerance   Balance   Static Sitting Normal   Dynamic Sitting Normal   Static Standing Good   Dynamic Standing Fair +   Ambulatory Fair   Activity Tolerance   Activity Tolerance Patient tolerated treatment well   Medical Staff Made Aware PT sisi due to the patient's co-morbidities, clinically unstable presentation, and present impairments which are a regression from the patient's baseline  Nurse Made Aware RN cleared pt for therapy   RUE Assessment   RUE Assessment WFL   LUE Assessment   LUE Assessment WFL   Hand Function   Gross Motor Coordination Functional   Fine Motor Coordination Functional   Sensation   Light Touch No apparent deficits   Cognition   Overall Cognitive Status    Arousal/Participation Alert; Cooperative   Attention Within functional limits   Orientation Level Oriented X4   Memory Within functional limits   Following Commands Follows all commands and directions without difficulty   Comments pt agreeable to therapy, minimally conversant, vague answers to evaluative questions, was able to follow all directives, oriented x 4, good safety awareness  Assessment   Prognosis Good   Assessment Pt is a 40 y o  male who was admitted to [facility] on 3/23/2022  Found unresponsiveness given narcan, after overdose on Methamphetamine possibly laced with fentanyl and now here with elevated troponin's, aspiration PN Drug overdose, multiple drugs, history of cardiac surgery   Pt's problem list also includes PMH of  has no past medical history on file    At baseline pt was completing I with ADL's/IaDL's, no AD with functional ambulation, +drives, works full time  Pt lives with spouse in a first floor set-up no KATHY  Currently pt requires set-up for overall ADLS and S/mod I without AD for functional mobility/transfers  Pt currently presents with impairments in the following categories - activity tolerance and coping skills   These impairments, as well as pt's fatigue  limit pt's ability to safely engage in all baseline areas of occupation, includingdriving, work/volunteer work , social participation  and leisure activities  From OT standpoint, recommend home with increased family support and no DME needs upon D/C  The patient's raw score on the AM-PAC Daily Activity inpatient short form is 24, standardized score is 57 54, greater than 39 4  Patients at this level are likely to benefit from discharge to home  Please refer to the recommendation of the Occupational Therapist for safe discharge planning   No further acute OT needs indicated at this time - Anticipate d/c home with family support when medically cleared - d/c from caseload   Goals   Patient Goals go home   Recommendation   OT Discharge Recommendation No rehabilitation needs   OT - OK to Discharge Yes   AM-PAC Daily Activity Inpatient   Lower Body Dressing 4   Bathing 4   Toileting 4   Upper Body Dressing 4   Grooming 4   Eating 4   Daily Activity Raw Score 24   Daily Activity Standardized Score (Calc for Raw Score >=11) 57 54   AM-PAC Applied Cognition Inpatient   Following a Speech/Presentation 3   Understanding Ordinary Conversation 4   Taking Medications 4   Remembering Where Things Are Placed or Put Away 4   Remembering List of 4-5 Errands 4   Taking Care of Complicated Tasks 4   Applied Cognition Raw Score 23   Applied Cognition Standardized Score 53 08     Shana TRIPLETT, OTR/L

## 2022-03-24 NOTE — ASSESSMENT & PLAN NOTE
Patient presented with a troponin of 6 which later increased to 22 and 34  Believed to be secondary to methamphetamine use  ECG showed sinus tach      Plan:  Telemetry monitoring negative   Patient asymptomatic  Follow up outpatient

## 2022-03-24 NOTE — DISCHARGE SUMMARY
INTERNAL MEDICINE RESIDENCY DISCHARGE SUMMARY     Kamille Man   40 y o  male  MRN: [id number]  Room[facility]Bed: [facility]-01     [address]   Encounter: [id number]    Principal Problem:    Drug overdose, multiple drugs  Active Problems:    History of cardiac surgery     Aspiration pneumonitis     Elevated troponin      Elevated troponin  Assessment & Plan  Patient presented with a troponin of 6 which later increased to 22 and 34  Believed to be secondary to methamphetamine use  ECG showed sinus tach  Plan:  Telemetry monitoring negative   Patient asymptomatic  Follow up outpatient     Aspiration pneumonitis   Assessment & Plan  Pt presented after overdose on Methamphetamine possibly laced with fentanyl  Chest xray showed diffuse bilateral consolidations -> concerning for possible aspiration pneumonitis  Given 1g/50 mL ceftriaxone in ED  Currently on room air    Plan:  Discharge on PO augmentin for 5 days for aspiration pneumonitis     History of cardiac surgery   Assessment & Plan  Pt has a thoracotomy scar, however no recorded surgery is chart and is a poor historian  Due to history of IV drug abuse, suspicion for heart valve replacement is high  Pt reports he is not on anticoagulation because he "ran out"  Unsure when he ran out or when he had the surgery  As per the ED, the patient's wife did not know any details of his medical history  Echocardiogram obtained 3/24 showed an LVEF of 50-55%  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  Plan:  Warfarin 5 mg x1 given on 3/23 -> spoke with cardiology, given echocardiogram results of a repaired tricuspid annular ring, anticoagulation is not indicated  Also the patient denied any history of DVT/PE or other indications for anticoagulation  Will plan to discharge the patient off of anticoagulation and have him follow-up with Cardiology      * Drug overdose, multiple drugs  Assessment & Plan  -Drug of choice is methamphetamine  Snorted it this morning   -Responded well to narcan and so suspect it was possibly laced with fentanyl   -Toxicology is following   -received one dose intranasal narcan at home and one dose of IV narcan by EMS  -Chest Xray taken in ED shows bilateral consolidations with differential of aspiration vs non-cardiogenic pulmonary edema  Most likely pulmonary edema due to bilaterality   -Pt currently unwilling to start rehab    Plan:  Pt on room air  Discharge with narcan   Will plan for patient to follow-up at [facility] in [place] in [address] [place] [address],  is a 40 YOM with a PMHx of appendectomy, IV drug abuse, and open-heart surgery (valve replacement for endocarditis) who presented to the ED after being found unresponsive by his girlfriend following methamphetamine overdose  His girlfriend gave him one dose of intranasal narcan, serving to improve his respiratory status and creating the impression that the methamphetamine was laced with fentanyl  Upon EMS arrival, he was once again unresponsive and was administered IV narcan, with subsequent respiratory status improvement  He was saturating in the mid 70s on arrival to the ED, was put on a non-rebreather mask, and eventually 12L midflow NC  His girlfriend was present on admission and stated to the ED that she does not know anything about his medical history  She was not present for the rest of his stay  Chest xray revealed bilateral lower lung consolidations suspicious for aspiration, and he was given one dose of ceftriaxone  INR was ordered and read as 1 02, and he was started on 5 mg warfarin  Medical toxicology was consulted and recommended taking aspiration precautions and PRN narcan  His respiratory status was monitored overnight and he was eventually weaned down to room air by the next morning  Patient will be sent home with prn narcan nasal spray      Due to his unknown medical history and poor follow up, an echocardiogram was ordered to investigate his probable heart valve replacement  Results revealed repaired tricuspid annular ring, therefore anticoagulation is not indicated  Warfarin discontinued  Patient should follow up with cardiology outpatient  Referral has been made  Patient was seen and examined on day of discharge  He has no complaints  Denies any chest pain, shortness of breath, headache, dizziness, abdominal pain, N/V, fever or chills  Patient states that he isn't sure when he had his heart surgery but it was about 12 or 14 years ago  Patient was given instructions to follow up outpatient with cardiology and to schedule an appointment with [facility] in [facility] to establish care with a PCP  He was also instructed to complete a 5 day course of PO augmentin for aspiration pneumonitis  /71   Pulse 84   Temp 98 5 °F (36 9 °C)   Resp 18   Ht 6' 1" (1 854 m)   Wt 88 kg (194 lb)   SpO2 94%   BMI 25 60 kg/m²     Physical Exam  Vitals reviewed  Constitutional:       General: He is not in acute distress  Appearance: Normal appearance  He is not ill-appearing  HENT:      Head: Normocephalic and atraumatic  Eyes:      Extraocular Movements: Extraocular movements intact  Conjunctiva/sclera: Conjunctivae normal       Pupils: Pupils are equal, round, and reactive to light  Cardiovascular:      Rate and Rhythm: Normal rate and regular rhythm  Heart sounds: No murmur heard  Pulmonary:      Effort: Pulmonary effort is normal  No respiratory distress  Comments: Breath sounds improved  Abdominal:      General: Abdomen is flat  Bowel sounds are normal  There is no distension  Palpations: Abdomen is soft  Tenderness: There is no abdominal tenderness  Musculoskeletal:      Right lower leg: No edema  Left lower leg: No edema  Skin:     General: Skin is warm and dry        Comments: Healed vertical scar on sternum from heart surgery, tattoos   Neurological:      General: No focal deficit present  Mental Status: He is alert and oriented to person, place, and time  Mental status is at baseline  Sensory: No sensory deficit  Psychiatric:         Mood and Affect: Mood normal          Behavior: Behavior normal          Thought Content: Thought content normal          Judgment: Judgment normal            DISCHARGE INFORMATION     PCP at Discharge: [address]     Admitting Provider: Cordell Bruno MD  Admission Date: 3/23/2022    Discharge Provider: Cordell Bruno MD  Discharge Date: 3/24/22    Discharge Disposition: Home/Self Care  Discharge Condition: good  Discharge with Lines: no    Discharge Diet: regular diet  Activity Restrictions: none  Test Results Pending at Discharge: none    Discharge Diagnoses:  Principal Problem:    Drug overdose, multiple drugs  Active Problems:    History of cardiac surgery     Aspiration pneumonitis     Elevated troponin  Resolved Problems:    * No resolved hospital problems  *      Consulting Providers:  [facility] toxicology      Diagnostic & Therapeutic Procedures Performed:  XR chest 1 view portable    Result Date: 3/23/2022  Impression: 1  Diffuse bilateral lung consolidation  Workstation performed: [id number]     CT head without contrast    Result Date: 3/23/2022  Impression: No acute intracranial abnormality  Workstation performed: [id number]       Code Status: Level 1 - Full Code  Advance Directive & Living Will: <no information>  Power of :  none on file  POLST:  none on file    Medications: There are no discharge medications for this patient  There are no discharge medications for this patient  There are no discharge medications for this patient  Allergies:   Allergies   Allergen Reactions    Vancomycin Hives       FOLLOW-UP     PCP Outpatient Follow-up:  Follow up within one week    Consulting Providers Follow-up:  Cardiology     Active Issues Requiring Follow-up:   Tricuspid annular ring, aspiration pneumonitis, drug overdose     Discharge Statement:   I spent 30 minutes minutes discharging the patient  This time was spent on the day of discharge  I had direct contact with the patient on the day of discharge  Additional documentation is required if more than 30 minutes were spent on discharge  Portions of the record may have been created with voice recognition software  Occasional wrong word or "sound a like" substitutions may have occurred due to the inherent limitations of voice recognition software    Read the chart carefully and recognize, using context, where substitutions have occurred     ==  Kae hSah MD  [address]  Internal Medicine PGY-1

## 2022-03-24 NOTE — PLAN OF CARE
Problem: Potential for Falls  Goal: Patient will remain free of falls  Description: INTERVENTIONS:  - Educate patient/family on patient safety including physical limitations  - Instruct patient to call for assistance with activity   - Consult OT/PT to assist with strengthening/mobility   - Keep Call bell within reach  - Keep bed low and locked with side rails adjusted as appropriate  - Keep care items and personal belongings within reach  - Initiate and maintain comfort rounds  - Make Fall Risk Sign visible to staff      - Apply yellow socks and bracelet for high fall risk patients  - Consider moving patient to room near nurses station  Outcome: Progressing     Problem: Prexisting or High Potential for Compromised Skin Integrity  Goal: Skin integrity is maintained or improved  Description: INTERVENTIONS:  - Identify patients at risk for skin breakdown  - Assess and monitor skin integrity  - Assess and monitor nutrition and hydration status  - Monitor labs   - Assess for incontinence   - Turn and reposition patient  - Assist with mobility/ambulation  - Relieve pressure over bony prominences  - Avoid friction and shearing  - Provide appropriate hygiene as needed including keeping skin clean and dry  - Evaluate need for skin moisturizer/barrier cream  - Collaborate with interdisciplinary team   - Patient/family teaching  - Consider wound care consult   Outcome: Progressing

## 2022-03-24 NOTE — ED ATTENDING ATTESTATION
3/23/2022  I saw and evaluated the patient  I have discussed the patient with the resident physician and agree with the resident's findings, assessment and plan as documented in the resident physician's note, unless otherwise documented below  All available laboratory and imaging studies were reviewed by myself  I was present for key portions of any procedure(s) performed by the resident and I was immediately available to provide assistance  I agree with the current assessment done in the Emergency Department  I have conducted an independent evaluation of this patient  Emergency Department Note- Wilburn Ganser 40 y o  male MRN: [id number]    Unit/Bed#: [facility] 207-01 Encounter: [id number]    Chief Complaint   Patient presents with    Loss of Consciousness     Per EMS found for unresponsive  Given 0 4 Narcan intranasally and woke up  Upon ED arrival pt  obtunded  SpO2 73 on room air       Wilburn Ganser is a 40 y o  male presenting via EMS after being found unresponsive  Per EMS, members of the household realized that patient was becoming unresponsive and notice pinpoint pupils  They gave him intranasal Narcan  Upon EMS arrival, patient was stool obtunded, unresponsive, and saturating at 73%  Patient received IV Narcan  He was placed on a non-rebreather with improvement in hypoxia  Upon becoming more responsive, patient became combative, attempting to punch had people  On arrival, patient is restrained, again obtunded but arousable  He is saturating 90% on non-rebreather  Patient has prior history of methamphetamine use as well as of Percocet use  Per patient's family who arrives shortly after the patient, patient has had a prior heart surgery for valve replacement, however, she is not sure of the details as this was before they were   Patient is supposed to be on Coumadin, however, has not been taking it      REVIEW OF SYSTEMS unable to obtain due to altered mental status    PAST MEDICAL HISTORY unable to obtain due to patient's altered mental status  Substance abuse    SURGICAL HISTORY unable to obtain due to altered mental status  Open-heart surgery    FAMILY HISTORY unable to obtain due to altered mental status    CURRENT MEDICATIONS Coumadin according to wife    ALLERGIES  Allergies   Allergen Reactions    Vancomycin Hives       SOCIAL HISTORY  Social History     Socioeconomic History    Marital status: Unknown     Spouse name: Not on file    Number of children: Not on file    Years of education: Not on file    Highest education level: Not on file   Occupational History    Not on file   Tobacco Use    Smoking status: Current Every Day Smoker    Smokeless tobacco: Current User   Vaping Use    Vaping Use: Never used   Substance and Sexual Activity    Alcohol use: Yes    Drug use: Not Currently    Sexual activity: Not on file   Other Topics Concern    Not on file   Social History Narrative    Not on file     Social Determinants of Health     Financial Resource Strain: Not on file   Food Insecurity: Not on file   Transportation Needs: Not on file   Physical Activity: Not on file   Stress: Not on file   Social Connections: Not on file   Intimate Partner Violence: Not on file   Housing Stability: Not on file       PHYSICAL EXAM  /74   Pulse 89   Temp 97 8 °F (36 6 °C) (Oral)   Resp 22   Ht 6' 1" (1 854 m)   Wt 88 3 kg (194 lb 10 7 oz)   SpO2 95%   BMI 25 68 kg/m²   Vital signs and nursing notes reviewed    CONSTITUTIONAL: male appearing stated age resting in bed, tachypneic and ill-appearing, obtunded, but responding to tactile stimuli   HEENT: atraumatic, normocephalic  Sclera anicteric, conjunctiva are not injected  Pupils are 2 mm and reactive  There is nasal trumpet and left naris with mild epistaxis  CARDIOVASCULAR/CHEST: RRR, unable to appreciate murmurs  2+ radial pulses  Healed Sternotomy    PULMONARY:  Tachypneic, increased work of breathing, hypoxic , requiring O2 via non-rebreather mask , diffuse sparse rhonchi present  ABDOMEN: non-distended  BS present, normoactive  Non-tender  MSK: moves all extremities, no deformities, no peripheral edema, no calf asymmetry  NEURO:  Obtunded , however , awakens to tactile stimuli , able to state full name and location  Moves all extremities spontaneously  SKIN:  Diaphoretic, appears well-perfused  MENTAL STATUS:  Obtunded        DIAGNOSTIC STUDIES  Results Reviewed     Procedure Component Value Units Date/Time    Rapid drug screen, urine [[id number]]  (Abnormal) Collected: 03/23/22 1806    Lab Status: Final result Specimen: Urine, Other Updated: 03/23/22 1837     Amph/Meth UR Positive     Barbiturate Ur Negative     Benzodiazepine Urine Negative     Cocaine Urine Negative     Methadone Urine Negative     Opiate Urine Negative     PCP Ur Negative     THC Urine Negative     Oxycodone Urine Negative    Narrative:      FOR MEDICAL PURPOSES ONLY  IF CONFIRMATION NEEDED PLEASE CONTACT THE LAB WITHIN 5 DAYS      Drug Screen Cutoff Levels:  AMPHETAMINE/METHAMPHETAMINES  1000 ng/mL  BARBITURATES     200 ng/mL  BENZODIAZEPINES     200 ng/mL  COCAINE      300 ng/mL  METHADONE      300 ng/mL  OPIATES      300 ng/mL  PHENCYCLIDINE     25 ng/mL  THC       50 ng/mL  OXYCODONE      100 ng/mL    HS Troponin I 2hr [[id number]]  (Normal) Collected: 03/23/22 1534    Lab Status: Final result Specimen: Blood from Arm, Right Updated: 03/23/22 1610     hs TnI 2hr 22 ng/L      Delta 2hr hsTnI 16 ng/L     HS Troponin 0hr (reflex protocol) [[id number]]  (Normal) Collected: 03/23/22 1325    Lab Status: Final result Specimen: Blood from Arm, Left Updated: 03/23/22 1407     hs TnI 0hr 6 ng/L     Comprehensive metabolic panel [[id number]]  (Abnormal) Collected: 03/23/22 1314    Lab Status: Final result Specimen: Blood from Arm, Left Updated: 03/23/22 1353     Sodium 139 mmol/L      Potassium 3 9 mmol/L      Chloride 108 mmol/L      CO2 26 mmol/L      ANION GAP 5 mmol/L BUN 14 mg/dL      Creatinine 0 85 mg/dL      Glucose 167 mg/dL      Calcium 8 3 mg/dL      Corrected Calcium 8 9 mg/dL      AST 26 U/L      ALT 40 U/L      Alkaline Phosphatase 87 U/L      Total Protein 6 5 g/dL      Albumin 3 3 g/dL      Total Bilirubin 0 42 mg/dL      eGFR 108 ml/min/1 73sq m     Narrative:      [place] guidelines for Chronic Kidney Disease (CKD):     Stage 1 with normal or high GFR (GFR > 90 mL/min/1 73 square meters)    Stage 2 Mild CKD (GFR = 60-89 mL/min/1 73 square meters)    Stage 3A Moderate CKD (GFR = 45-59 mL/min/1 73 square meters)    Stage 3B Moderate CKD (GFR = 30-44 mL/min/1 73 square meters)    Stage 4 Severe CKD (GFR = 15-29 mL/min/1 73 square meters)    Stage 5 End Stage CKD (GFR <15 mL/min/1 73 square meters)  Note: GFR calculation is accurate only with a steady state creatinine    Salicylate level [[id number]]  (Abnormal) Collected: 03/23/22 1314    Lab Status: Final result Specimen: Blood from Arm, Left Updated: 86/90/40 0358     Salicylate Lvl <3 mg/dL     Acetaminophen level-If concentration is detectable, please discuss with medical  on call   [[id number]]  (Abnormal) Collected: 03/23/22 1314    Lab Status: Final result Specimen: Blood from Arm, Left Updated: 03/23/22 1353     Acetaminophen Level <2 ug/mL     Ethanol [[id number]]  (Normal) Collected: 03/23/22 1314    Lab Status: Final result Specimen: Blood from Arm, Left Updated: 03/23/22 1347     Ethanol Lvl <3 mg/dL     CBC and differential [[id number]] Collected: 03/23/22 1314    Lab Status: Final result Specimen: Blood from Arm, Left Updated: 03/23/22 1331     WBC 10 14 Thousand/uL      RBC 5 28 Million/uL      Hemoglobin 15 8 g/dL      Hematocrit 47 0 %      MCV 89 fL      MCH 29 9 pg      MCHC 33 6 g/dL      RDW 12 4 %      MPV 8 9 fL      Platelets 822 Thousands/uL      nRBC 0 /100 WBCs      Neutrophils Relative 64 %      Immat GRANS % 0 %      Lymphocytes Relative 27 % Monocytes Relative 6 %      Eosinophils Relative 2 %      Basophils Relative 1 %      Neutrophils Absolute 6 47 Thousands/µL      Immature Grans Absolute 0 04 Thousand/uL      Lymphocytes Absolute 2 74 Thousands/µL      Monocytes Absolute 0 65 Thousand/µL      Eosinophils Absolute 0 17 Thousand/µL      Basophils Absolute 0 07 Thousands/µL     Blood gas, venous [[id number]]  (Abnormal) Collected: 03/23/22 1314    Lab Status: Final result Specimen: Blood from Arm, Left Updated: 03/23/22 1325     pH, Matti 7 326     pCO2, Matti 52 0 mm Hg      pO2, Matti 66 4 mm Hg      HCO3, Matti 26 6 mmol/L      Base Excess, Matti -0 4 mmol/L      O2 Content, Matti 21 0 ml/dL      O2 HGB, VENOUS 90 5 %           CT head without contrast   Final Result      No acute intracranial abnormality  Workstation performed: [id number]         XR chest 1 view portable   Final Result      1  Diffuse bilateral lung consolidation  Workstation performed: [id number]             PROCEDURES  ECG 12 Lead Documentation Only    Date/Time: 3/23/2022 1:05 PM  Performed by: Ana Angeles MD  Authorized by: Ana Angeles MD     Comments:      Sinus tachycardia, , , , QTc 489, normal axis, RSR' of incomplete RBBB, Terminal R waves in lead aVR suggest possible TCA exposure, no KATHY/T wave changes to suggest ischemia, no STEMI  No prior EKG available for my review    CriticalCare Time  Performed by: Ana Angeles MD  Authorized by: Ana Angeles MD     Critical care provider statement:     Critical care time (minutes):  35    Critical care time was exclusive of:  Separately billable procedures and treating other patients and teaching time    Critical care was necessary to treat or prevent imminent or life-threatening deterioration of the following conditions:  CNS failure or compromise and respiratory failure    Critical care was time spent personally by me on the following activities:  Obtaining history from patient or surrogate, examination of patient, review of old charts, ordering and review of laboratory studies, ordering and review of radiographic studies, ordering and performing treatments and interventions, discussions with consultants, evaluation of patient's response to treatment and re-evaluation of patient's condition                ED COURSE  Medications   naloxone (NARCAN) 0 04 mg/mL syringe 0 04 mg (has no administration in time range)   naloxone (FOR EMS ONLY) (NARCAN) 2 MG/2ML injection 2 mg (0 mg Does not apply Given to EMS 3/23/22 1311)   ceftriaxone (ROCEPHIN) 1 g/50 mL in dextrose IVPB (0 mg Intravenous Stopped 3/23/22 162)     40year old male presenting with altered mental status after suspected overdose  At present, patient is obtunded but does respond  Patient initially arrives restrained due to non-specific aggressive behavior (aka not directed at any particular person, just striking/punching in setting of agitation and confusion)  Patient initially briefly restrained, however, this was quickly discontinued as there is no additional aggression and patient awakens appropriately without any agitation  Patient does take awhile to awaken despite receiving naloxone with EMS  At present, he is tachypneic and there is no indication for repeat administration of naloxone  Suspect mixed overdose  Patient is requiring oxygen support  His lungs sound rhonchorous and x-ray reveals bilateral lung infiltrates  Aspiration is a possibility, as is non-cardiogenic pulmonary edema versus another pathology  Suspect this is acute, as, according to patient's wife, he had no respiratory symptoms prior to the overdose  EKG obtained, as reviewed by me as above  VBG pH 7 32 with mild hypercarbia, CMP and CBC unremarkable  HS trop 6, repeat is 22  UDS + for amphetamines/methamphetamines  1 Gm Rocephin administered given possibility of aspiration event  Patient admitted to [facility] for further evaluation and monitoring  CLINICAL IMPRESSION  Final diagnoses:   Polysubstance overdose   Respiratory failure ([facility] [address] )

## 2022-03-25 ENCOUNTER — TELEPHONE (OUTPATIENT)
Dept: INTERNAL MEDICINE CLINIC | Facility: CLINIC | Age: 42
End: 2022-03-25

## 2022-03-25 NOTE — TELEPHONE ENCOUNTER
----- Message from Shweta Villalta MD sent at 3/24/2022  3:31 PM EDT -----  Please schedule a follow-up TCM appointment in around 1 week  Thank you

## 2022-03-25 NOTE — UTILIZATION REVIEW
Inpatient Admission Authorization Request   NOTIFICATION OF INPATIENT ADMISSION/INPATIENT AUTHORIZATION REQUEST   SERVICING FACILITY:   [facility]  Address: [address] [place] [address], [address] [place] [address]  Tax ID: [id number]  NPI: [id number]  Place of Service: Inpatient [address] [facility]  [address]  Place of Service Code: 24     ATTENDING PROVIDER:  Attending Name and NPI#: Guillermina Capone Md [[id number]]  Address: [address] [place] [address]  Phone: [phone]     UTILIZATION REVIEW CONTACT:  [facility] Utilization   Network Utilization Review Department  Phone: [phone]  Fax: [phone]  Email: [email or web address]     PHYSICIAN ADVISORY SERVICES:  FOR [id number] REVIEW - MEDICAL NECESSITY DENIAL  Phone: [phone]  Fax: [phone]  Email: [email or web address]     TYPE OF REQUEST:  Inpatient Status     ADMISSION INFORMATION:  ADMISSION DATE/TIME: 3/23/22  4:58 PM  PATIENT DIAGNOSIS CODE/DESCRIPTION:  Overdose [T50 901A]  Polysubstance overdose [T50 901A]  DISCHARGE DATE/TIME: 3/24/2022  3:46 PM   IMPORTANT INFORMATION:  Please contact the [facility] directly with any questions or concerns regarding this request  Department voicemails are confidential     Send requests for admission clinical reviews, concurrent reviews, approvals, and administrative denials due to lack of clinical to fax [phone]

## 2022-03-29 NOTE — TELEPHONE ENCOUNTER
3rd Attempt- Home number on file is restricted so no message left  Called work number and Sanjeev Miguel stated patient doesn't work there